# Patient Record
Sex: FEMALE | Race: BLACK OR AFRICAN AMERICAN | NOT HISPANIC OR LATINO | Employment: FULL TIME | URBAN - METROPOLITAN AREA
[De-identification: names, ages, dates, MRNs, and addresses within clinical notes are randomized per-mention and may not be internally consistent; named-entity substitution may affect disease eponyms.]

---

## 2017-06-25 ENCOUNTER — HOSPITAL ENCOUNTER (EMERGENCY)
Facility: HOSPITAL | Age: 42
Discharge: HOME | End: 2017-06-25
Attending: EMERGENCY MEDICINE
Payer: COMMERCIAL

## 2017-06-25 VITALS
RESPIRATION RATE: 18 BRPM | OXYGEN SATURATION: 98 % | SYSTOLIC BLOOD PRESSURE: 136 MMHG | HEIGHT: 66 IN | WEIGHT: 242 LBS | TEMPERATURE: 99 F | BODY MASS INDEX: 38.89 KG/M2 | HEART RATE: 52 BPM | DIASTOLIC BLOOD PRESSURE: 95 MMHG

## 2017-06-25 DIAGNOSIS — R07.9 CHEST PAIN, UNSPECIFIED TYPE: Primary | ICD-10-CM

## 2017-06-25 DIAGNOSIS — R07.9 CHEST PAIN: ICD-10-CM

## 2017-06-25 LAB
ALBUMIN SERPL BCP-MCNC: 3.4 G/DL
ALP SERPL-CCNC: 75 U/L
ALT SERPL W/O P-5'-P-CCNC: 22 U/L
ANION GAP SERPL CALC-SCNC: 9 MMOL/L
AST SERPL-CCNC: 32 U/L
BASOPHILS # BLD AUTO: 0.03 K/UL
BASOPHILS NFR BLD: 0.5 %
BILIRUB SERPL-MCNC: 0.5 MG/DL
BUN SERPL-MCNC: 11 MG/DL
CALCIUM SERPL-MCNC: 9 MG/DL
CHLORIDE SERPL-SCNC: 109 MMOL/L
CO2 SERPL-SCNC: 20 MMOL/L
CREAT SERPL-MCNC: 0.8 MG/DL
DIFFERENTIAL METHOD: NORMAL
EOSINOPHIL # BLD AUTO: 0.1 K/UL
EOSINOPHIL NFR BLD: 2.3 %
ERYTHROCYTE [DISTWIDTH] IN BLOOD BY AUTOMATED COUNT: 13.2 %
EST. GFR  (AFRICAN AMERICAN): >60 ML/MIN/1.73 M^2
EST. GFR  (NON AFRICAN AMERICAN): >60 ML/MIN/1.73 M^2
GLUCOSE SERPL-MCNC: 77 MG/DL
HCT VFR BLD AUTO: 41.3 %
HGB BLD-MCNC: 13.7 G/DL
LYMPHOCYTES # BLD AUTO: 1.8 K/UL
LYMPHOCYTES NFR BLD: 29.5 %
MCH RBC QN AUTO: 27.8 PG
MCHC RBC AUTO-ENTMCNC: 33.2 %
MCV RBC AUTO: 84 FL
MONOCYTES # BLD AUTO: 0.5 K/UL
MONOCYTES NFR BLD: 9 %
NEUTROPHILS # BLD AUTO: 3.5 K/UL
NEUTROPHILS NFR BLD: 58.5 %
PLATELET # BLD AUTO: 206 K/UL
PMV BLD AUTO: 11.7 FL
POTASSIUM SERPL-SCNC: 4.5 MMOL/L
PROT SERPL-MCNC: 7.9 G/DL
RBC # BLD AUTO: 4.92 M/UL
SODIUM SERPL-SCNC: 138 MMOL/L
TROPONIN I SERPL DL<=0.01 NG/ML-MCNC: 0.01 NG/ML
WBC # BLD AUTO: 5.99 K/UL

## 2017-06-25 PROCEDURE — 84484 ASSAY OF TROPONIN QUANT: CPT

## 2017-06-25 PROCEDURE — 80053 COMPREHEN METABOLIC PANEL: CPT

## 2017-06-25 PROCEDURE — 85025 COMPLETE CBC W/AUTO DIFF WBC: CPT

## 2017-06-25 PROCEDURE — 93005 ELECTROCARDIOGRAM TRACING: CPT

## 2017-06-25 PROCEDURE — 96372 THER/PROPH/DIAG INJ SC/IM: CPT

## 2017-06-25 PROCEDURE — 99284 EMERGENCY DEPT VISIT MOD MDM: CPT | Mod: 25

## 2017-06-25 PROCEDURE — 63600175 PHARM REV CODE 636 W HCPCS: Performed by: EMERGENCY MEDICINE

## 2017-06-25 RX ORDER — SUMATRIPTAN SUCCINATE 25 MG/1
50 TABLET ORAL
COMMUNITY
End: 2021-02-22 | Stop reason: CLARIF

## 2017-06-25 RX ORDER — ACETAMINOPHEN 325 MG/1
650 TABLET ORAL EVERY 6 HOURS PRN
Qty: 30 TABLET | Refills: 0 | Status: SHIPPED | OUTPATIENT
Start: 2017-06-25 | End: 2017-07-27

## 2017-06-25 RX ORDER — KETOROLAC TROMETHAMINE 30 MG/ML
15 INJECTION, SOLUTION INTRAMUSCULAR; INTRAVENOUS
Status: COMPLETED | OUTPATIENT
Start: 2017-06-25 | End: 2017-06-25

## 2017-06-25 RX ORDER — PREDNISONE 50 MG/1
50 TABLET ORAL DAILY
Qty: 5 TABLET | Refills: 0 | Status: SHIPPED | OUTPATIENT
Start: 2017-06-25 | End: 2017-06-30

## 2017-06-25 RX ADMIN — KETOROLAC TROMETHAMINE 15 MG: 30 INJECTION, SOLUTION INTRAMUSCULAR at 01:06

## 2017-06-25 NOTE — DISCHARGE INSTRUCTIONS
Please follow-up with your regular doctor this week.  Return for new or worsening symptoms such as worsening chest pain or shortness of breath or fever.  Please take medications as prescribed.

## 2017-06-25 NOTE — ED TRIAGE NOTES
42 y/o female comes in today with the cc of cp since Thursday, pt denies gypsy, N/V. Pt states she has hx of pleurisy. Pt in no distress, skin is normal.

## 2017-06-25 NOTE — ED PROVIDER NOTES
Encounter Date: 2017    SCRIBE #1 NOTE: I, Albaro Lilly, am scribing for, and in the presence of,  Tor Hampton Jr., MD. I have scribed the following portions of the note - Other sections scribed: HPI, ROS.       History     Chief Complaint   Patient presents with    Chest Pain     Chest pains, comes and go but constant today.  center chest radiating to back.  denies pain in arm or neck, denies nausea or vomiting.  Hx Pleura     CC: Chest Pain    HPI: This 41 year old female with a past medical history of migraine headache and pleurisy presents to the ED complaining of a 4 day history of a sharp chest pain radiating to her back. Pt reports the pain was intermittent the first 3 days and has been constant since last night. She reports the pain is exacerbated with movement, deep breaths, and talking. Pt denies associated weakness and numbness. Pt reports the pain feels similar to her pleurisy pain. Pt also denies cough, weakness, vomiting, fever, leg swelling, and diaphoresis. Pt denies a history of pulmonary embolism or blood clots. No other symptoms reported. No alleviating factors reported. No attempts at medication have been made.             The history is provided by the patient. No  was used.     Review of patient's allergies indicates:   Allergen Reactions    Rocephin [ceftriaxone] Hives and Itching     Past Medical History:   Diagnosis Date    Migraine headache      Past Surgical History:   Procedure Laterality Date    CERVICAL CERCLAGE       SECTION, CLASSIC      HYSTERECTOMY       Family History   Problem Relation Age of Onset    Heart disease Father     Diabetes Paternal Grandmother     Colon cancer Neg Hx     Breast cancer Neg Hx     Ovarian cancer Neg Hx      Social History   Substance Use Topics    Smoking status: Never Smoker    Smokeless tobacco: Never Used    Alcohol use No     Review of Systems   Constitutional: Negative for diaphoresis and  fever.   HENT: Negative for sore throat.    Respiratory: Negative for cough and shortness of breath.    Cardiovascular: Positive for chest pain (radiating to back). Negative for leg swelling.   Gastrointestinal: Negative for nausea and vomiting.   Genitourinary: Negative for dysuria.   Musculoskeletal: Positive for back pain.   Skin: Negative for rash.   Neurological: Negative for weakness and numbness.   Hematological: Does not bruise/bleed easily.       Physical Exam     Initial Vitals [06/25/17 1155]   BP Pulse Resp Temp SpO2   (!) 164/100 62 18 98.7 °F (37.1 °C) 99 %      MAP       121.33         Physical Exam    Nursing note and vitals reviewed.  Constitutional: She appears well-developed and well-nourished. She is not diaphoretic. No distress.   HENT:   Head: Normocephalic and atraumatic.   Right Ear: External ear normal.   Left Ear: External ear normal.   Nose: Nose normal.   Mouth/Throat: Oropharynx is clear and moist.   Eyes: Conjunctivae and EOM are normal. Pupils are equal, round, and reactive to light. Right eye exhibits no discharge. Left eye exhibits no discharge. No scleral icterus.   Neck: Normal range of motion. Neck supple.   Cardiovascular: Normal rate, regular rhythm, normal heart sounds and intact distal pulses.   No murmur heard.  Pulmonary/Chest: Breath sounds normal. No respiratory distress. She has no wheezes. She has no rhonchi. She has no rales.   Abdominal: Soft. Bowel sounds are normal. She exhibits no distension and no mass. There is no tenderness. There is no rebound and no guarding.   Musculoskeletal: Normal range of motion. She exhibits no edema or tenderness.   Neurological: She is alert and oriented to person, place, and time. She has normal strength. No cranial nerve deficit or sensory deficit.   Skin: Skin is warm and dry. No rash noted. No erythema. No pallor.   Psychiatric: She has a normal mood and affect. Her behavior is normal. Judgment and thought content normal.         ED  Course   Procedures  Labs Reviewed   COMPREHENSIVE METABOLIC PANEL - Abnormal; Notable for the following:        Result Value    CO2 20 (*)     Albumin 3.4 (*)     All other components within normal limits   CBC W/ AUTO DIFFERENTIAL   TROPONIN I     EKG Readings: (Independently Interpreted)   Initial Reading: No STEMI.   EKG reviewed and interpreted by me shows sinus bradycardia at a rate of 55.  MS, QRS, QT intervals within normal limits.  Is a normal axis.  There is normal R-wave progression.  There are no ST segment or T-wave ischemic findings.       X-Rays:   Independently Interpreted Readings:   Other Readings:  Chest x-ray reviewed and interpreted by me shows no evidence of pulmonary edema, pneumothorax, or consolidations/ infiltrates.    Medical Decision Making:   ED Management:  This is the emergent evaluation of a 41-year-old female presents the emergency department complaining of chest pain which has been intermittent for 2 days and then constant for the past 18 hours approximately.Differential diagnosis at the time of initial evaluation included, but was not limited to:  acute myocardial infarction, acute coronary syndrome, pericarditis, myocarditis, pneumonia, pneumothorax, gastroesophageal reflux disease, pleurisy, costochondritis.  I consider but doubt pulmonary embolus and aortic dissection.  The patient states that she has a history of pleurisy and this feels like her past episodes of pleurisy.  She is not coughing is no fever.  No recent immobilization.  No lower extremity erythema, edema, or asymmetry.  No neurologic symptoms.  Patient isn't hypertensive or tachycardic.  Patient states pain is worse with taking a deep breath.  This is likely pleurisy.  The patient's chest x-ray and laboratory evaluation EKG are unremarkable and on concerning.  Patient has had chest pain which is been constant for at least the last 18 hours.  However, her troponin was almost undetectable.  I doubt acute coronary  syndrome.  I will have this patient treated for inflammation symptomatically and follow-up with her PCP.  She was advised return for any new or worsening symptoms such as fever, worsening shortness of breath, worsening chest pain.            Scribe Attestation:   Scribe #1: I performed the above scribed service and the documentation accurately describes the services I performed. I attest to the accuracy of the note.    Attending Attestation:           Physician Attestation for Scribe:  Physician Attestation Statement for Scribe #1: I, Tor Hampton Jr., MD, reviewed documentation, as scribed by Albaro Lilly in my presence, and it is both accurate and complete.                 ED Course     Clinical Impression:   The primary encounter diagnosis was Chest pain, unspecified type. A diagnosis of Chest pain was also pertinent to this visit.                           Tor Hampton Jr., MD  06/25/17 8220

## 2017-07-27 ENCOUNTER — PATIENT MESSAGE (OUTPATIENT)
Dept: ADMINISTRATIVE | Facility: OTHER | Age: 42
End: 2017-07-27

## 2017-07-27 ENCOUNTER — OFFICE VISIT (OUTPATIENT)
Dept: FAMILY MEDICINE | Facility: CLINIC | Age: 42
End: 2017-07-27
Payer: COMMERCIAL

## 2017-07-27 VITALS
BODY MASS INDEX: 42.35 KG/M2 | WEIGHT: 269.81 LBS | OXYGEN SATURATION: 97 % | HEART RATE: 87 BPM | DIASTOLIC BLOOD PRESSURE: 72 MMHG | SYSTOLIC BLOOD PRESSURE: 128 MMHG | HEIGHT: 67 IN | TEMPERATURE: 98 F

## 2017-07-27 DIAGNOSIS — E66.9 OBESITY (BMI 30-39.9): ICD-10-CM

## 2017-07-27 DIAGNOSIS — R09.1 PLEURISY: ICD-10-CM

## 2017-07-27 DIAGNOSIS — J45.901 ASTHMA WITH ACUTE EXACERBATION, UNSPECIFIED ASTHMA SEVERITY: Primary | ICD-10-CM

## 2017-07-27 PROCEDURE — 99999 PR PBB SHADOW E&M-EST. PATIENT-LVL III: CPT | Mod: PBBFAC,,, | Performed by: NURSE PRACTITIONER

## 2017-07-27 PROCEDURE — 99214 OFFICE O/P EST MOD 30 MIN: CPT | Mod: S$GLB,,, | Performed by: NURSE PRACTITIONER

## 2017-07-27 RX ORDER — PREDNISONE 20 MG/1
20 TABLET ORAL 2 TIMES DAILY
Qty: 10 TABLET | Refills: 0 | Status: SHIPPED | OUTPATIENT
Start: 2017-07-27 | End: 2017-08-01

## 2017-07-27 RX ORDER — CODEINE PHOSPHATE AND GUAIFENESIN 10; 100 MG/5ML; MG/5ML
5 SOLUTION ORAL EVERY 4 HOURS PRN
Qty: 120 ML | Refills: 0 | Status: SHIPPED | OUTPATIENT
Start: 2017-07-27 | End: 2018-10-10 | Stop reason: CLARIF

## 2017-07-27 RX ORDER — AZITHROMYCIN 250 MG/1
TABLET, FILM COATED ORAL
Qty: 6 TABLET | Refills: 0 | Status: SHIPPED | OUTPATIENT
Start: 2017-07-27 | End: 2018-10-10 | Stop reason: CLARIF

## 2017-07-27 NOTE — PROGRESS NOTES
This dictation has been generated using Dragon Dictation some phonetic errors may occur.     Susanne was seen today for cough.    Diagnoses and all orders for this visit:    Asthma with acute exacerbation, unspecified asthma severity    Pleurisy    Obesity (BMI 30-39.9)  -     Ambulatory Referral to [facility] ([facility])    Other orders  -     guaifenesin-codeine 100-10 mg/5 ml (CHERATUSSIN AC)  mg/5 mL syrup; Take 5 mLs by mouth every 4 (four) hours as needed for Cough. Warning:  Risk of somnolence.  -     azithromycin (Z-KRISTEN) 250 MG tablet; Take 2 tablets by mouth on day 1; Take 1 tablet by mouth on days 2-5  -     predniSONE (DELTASONE) 20 MG tablet; Take 1 tablet (20 mg total) by mouth 2 (two) times daily.  -     [facility] ASSIGN QUESTIONNAIRE SERIES ([facility])  -     [facility] [place] Patient Entered [facility] ([facility])    Rx as above.  No AMI/PE.   Pt request referral to [facility]. She has to complete questionnaires.  Discussed grief and loss. Refer to  and consider grief group.     Return if symptoms worsen or fail to improve.      ________________________________________________________________  ________________________________________________________________        Chief Complaint   Patient presents with    Cough     History of present illness  This 42 y.o. presents today for complaint of chest pain and cold symptoms.  Patient notes symptoms have been present for about 5 weeks.  She went to the emergency room about a month ago for cough and cold issues.  She was having wheezing and shortness of breath.  She has asthma.  Diagnosed with pleurisy.  Her symptoms seemed to improve briefly.  She then came down with strep throat and received a prescription for amoxicillin which she has completed.  The cough has been persistent now for 5 weeks.  She indicates that cough is occasionally productive.  She does cough to the point of vomiting.  She had promethazine VC, Mucinex D, and other cough med  without improvement.   Patient also notes  Increased stress and trouble coping with the loss of mother on .  She hasn't sought any psychiatric care.  She does become emotional.  No suicidal or violent ideation.  Mother had been coping with cancer and had completed chemotherapy and some radiation when she developed a bowel obstruction.  She wouldn't or couldn't shut down and passed away on the night.      Answers for HPI/ROS submitted by the patient on 2017   Cough  Chronicity: chronic  Onset: more than 1 month ago  Progression since onset: waxing and waning  Frequency: every few minutes  Cough characteristics: non-productive  chest pain: Yes  headaches: Yes  myalgias: Yes  shortness of breath: Yes  sweats: Yes  Aggravated by: stress  asthma: Yes  environmental allergies: Yes  Treatments tried: OTC cough suppressant, body position changes, leukotriene antagonists, prescription cough suppressant, rest  Improvement on treatment: no relief    Reviewed histories. Engaged fiancee of 4 years.   Past Medical History:   Diagnosis Date    Migraine headache        Past Surgical History:   Procedure Laterality Date    CERVICAL CERCLAGE       SECTION, CLASSIC      HYSTERECTOMY         Family History   Problem Relation Age of Onset    Heart disease Father     Diabetes Paternal Grandmother     Colon cancer Neg Hx     Breast cancer Neg Hx     Ovarian cancer Neg Hx        Social History     Social History    Marital status:      Spouse name: N/A    Number of children: 1    Years of education: N/A     Occupational History     [employer]     Social History Main Topics    Smoking status: Never Smoker    Smokeless tobacco: Never Used    Alcohol use No    Drug use: No    Sexual activity: Yes     Partners: Male     Birth control/ protection: Condom     Other Topics Concern    None     Social History Narrative    .  .  Healthy son.  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  Non smoker.          Current Outpatient Prescriptions   Medication Sig Dispense Refill    BIOTIN ORAL Take 200 mg by mouth once daily.      multivitamin (ONE DAILY MULTIVITAMIN) per tablet Take 1 tablet by mouth once daily.      sumatriptan (IMITREX) 25 MG Tab Take 50 mg by mouth every 2 (two) hours as needed.      azithromycin (Z-KRISTEN) 250 MG tablet Take 2 tablets by mouth on day 1; Take 1 tablet by mouth on days 2-5 6 tablet 0    guaifenesin-codeine 100-10 mg/5 ml (CHERATUSSIN AC)  mg/5 mL syrup Take 5 mLs by mouth every 4 (four) hours as needed for Cough. Warning:  Risk of somnolence. 120 mL 0    predniSONE (DELTASONE) 20 MG tablet Take 1 tablet (20 mg total) by mouth 2 (two) times daily. 10 tablet 0     No current facility-administered medications for this visit.        Review of patient's allergies indicates:   Allergen Reactions    Rocephin [ceftriaxone] Hives and Itching       Physical examination  Vitals Reviewed  Gen. Well-dressed well-nourished no apparent distress  Skin warm dry and intact.  No rashes noted.  HEENT.  TM intact bilateral with normal light reflex.  No mastoid tenderness during percussion.  Nares patent bilateral.  Pharynx is unremarkable.  No maxillary or frontal sinus tenderness when percussed.    Neck is supple without adenopathy  Chest.  Respirations are even unlabored.  Lungs are clear to auscultation.  Cardiac regular rate and rhythm.  No chest wall adenopathy noted.  Neuro. Awake alert oriented x4.  Normal judgment and cognition noted.  Extremities no clubbing cyanosis or edema noted.     Call or return to clinic prn if these symptoms worsen or fail to improve as anticipated.

## 2017-08-21 ENCOUNTER — HOSPITAL ENCOUNTER (OUTPATIENT)
Dept: RADIOLOGY | Facility: HOSPITAL | Age: 42
Discharge: HOME | End: 2017-08-21
Attending: INTERNAL MEDICINE
Payer: COMMERCIAL

## 2017-08-21 ENCOUNTER — PATIENT MESSAGE (OUTPATIENT)
Dept: FAMILY MEDICINE | Facility: CLINIC | Age: 42
End: 2017-08-21

## 2017-08-21 VITALS — BODY MASS INDEX: 42.22 KG/M2 | WEIGHT: 269 LBS | HEIGHT: 67 IN

## 2017-08-21 DIAGNOSIS — Z12.31 ENCOUNTER FOR SCREENING MAMMOGRAM FOR BREAST CANCER: ICD-10-CM

## 2017-08-21 DIAGNOSIS — Z12.31 ENCOUNTER FOR SCREENING MAMMOGRAM FOR BREAST CANCER: Primary | ICD-10-CM

## 2017-08-21 PROCEDURE — 77067 SCR MAMMO BI INCL CAD: CPT | Mod: 26,,, | Performed by: RADIOLOGY

## 2017-08-21 PROCEDURE — 77063 BREAST TOMOSYNTHESIS BI: CPT | Mod: 26,,, | Performed by: RADIOLOGY

## 2017-08-21 PROCEDURE — 77067 SCR MAMMO BI INCL CAD: CPT | Mod: TC

## 2017-09-05 ENCOUNTER — PATIENT MESSAGE (OUTPATIENT)
Dept: FAMILY MEDICINE | Facility: CLINIC | Age: 42
End: 2017-09-05

## 2018-10-10 ENCOUNTER — HOSPITAL ENCOUNTER (OUTPATIENT)
Dept: RADIOLOGY | Facility: HOSPITAL | Age: 43
Discharge: HOME | End: 2018-10-10
Attending: PODIATRIST
Payer: COMMERCIAL

## 2018-10-10 ENCOUNTER — OFFICE VISIT (OUTPATIENT)
Dept: PODIATRY | Facility: CLINIC | Age: 43
End: 2018-10-10
Payer: COMMERCIAL

## 2018-10-10 VITALS
DIASTOLIC BLOOD PRESSURE: 89 MMHG | BODY MASS INDEX: 41.43 KG/M2 | SYSTOLIC BLOOD PRESSURE: 127 MMHG | WEIGHT: 273.38 LBS | HEART RATE: 76 BPM | HEIGHT: 68 IN

## 2018-10-10 DIAGNOSIS — M79.672 FOOT PAIN, BILATERAL: ICD-10-CM

## 2018-10-10 DIAGNOSIS — M72.2 PLANTAR FASCIITIS: Primary | ICD-10-CM

## 2018-10-10 DIAGNOSIS — M79.671 FOOT PAIN, BILATERAL: ICD-10-CM

## 2018-10-10 DIAGNOSIS — M24.573 EQUINUS CONTRACTURE OF ANKLE: ICD-10-CM

## 2018-10-10 DIAGNOSIS — M72.2 PLANTAR FASCIITIS: ICD-10-CM

## 2018-10-10 PROCEDURE — 99203 OFFICE O/P NEW LOW 30 MIN: CPT | Mod: 25,S$GLB,, | Performed by: PODIATRIST

## 2018-10-10 PROCEDURE — 73630 X-RAY EXAM OF FOOT: CPT | Mod: 26,50,, | Performed by: RADIOLOGY

## 2018-10-10 PROCEDURE — 29540 STRAPPING ANKLE &/FOOT: CPT | Mod: 50,S$GLB,, | Performed by: PODIATRIST

## 2018-10-10 PROCEDURE — 99999 PR PBB SHADOW E&M-EST. PATIENT-LVL III: CPT | Mod: PBBFAC,,, | Performed by: PODIATRIST

## 2018-10-10 PROCEDURE — 3008F BODY MASS INDEX DOCD: CPT | Mod: CPTII,S$GLB,, | Performed by: PODIATRIST

## 2018-10-10 PROCEDURE — 73630 X-RAY EXAM OF FOOT: CPT | Mod: 50,TC

## 2018-10-10 RX ORDER — MELOXICAM 15 MG/1
15 TABLET ORAL DAILY
Qty: 30 TABLET | Refills: 0 | Status: SHIPPED | OUTPATIENT
Start: 2018-10-10 | End: 2020-03-11

## 2018-10-10 NOTE — PROGRESS NOTES
Subjective:      Patient ID: Susanne Marin is a 43 y.o. female.    Chief Complaint: Foot Pain (both feet)    Sharp deep pain bottom both feet.  Gradual onset, worsening over past several weeks, aggravated by increased weight bearing, shoe gear, pressure.  No previous medical treatment.  OTC pain med not helping. Denies trauma, surgery both feet.    Review of Systems   Constitution: Negative for chills, diaphoresis, fever, malaise/fatigue and night sweats.   Cardiovascular: Negative for claudication, cyanosis, leg swelling and syncope.   Skin: Negative for color change, dry skin, nail changes, rash, suspicious lesions and unusual hair distribution.   Musculoskeletal: Negative for falls, joint pain, joint swelling, muscle cramps, muscle weakness and stiffness.   Gastrointestinal: Negative for constipation, diarrhea, nausea and vomiting.   Neurological: Negative for brief paralysis, disturbances in coordination, focal weakness, numbness, paresthesias, sensory change and tremors.           Objective:      Physical Exam   Constitutional: She is oriented to person, place, and time. She appears well-developed and well-nourished. She is cooperative. No distress.   Cardiovascular:   Pulses:       Popliteal pulses are 2+ on the right side, and 2+ on the left side.        Dorsalis pedis pulses are 2+ on the right side, and 2+ on the left side.        Posterior tibial pulses are 2+ on the right side, and 2+ on the left side.   Capillary refill 3 seconds all toes/distal feet, all toes/both feet warm to touch.      Negative lymphadenopathy bilateral popliteal fossa and tarsal tunnel.      Negavie lower extremity edema bilateral.     Musculoskeletal:        Right ankle: She exhibits normal range of motion, no swelling, no ecchymosis, no deformity, no laceration and normal pulse. Achilles tendon normal. Achilles tendon exhibits no pain, no defect and normal Leiva's test results.   Sharp deep pain to palpation inferior heel  right and left at medial calcaneal tubercle without ecchymosis, erythema, edema, or cardinal signs infection, and no signs of trauma.    Ankle dorsiflexion decreased at <10 degrees bilateral with moderate increase with knee flexion bilateral.    Otherwise, Normal angle, base, station of gait. All ten toes without clubbing, cyanosis, or signs of ischemia.  No pain to palpation bilateral lower extremities.  Range of motion, stability, muscle strength, and muscle tone normal bilateral feet and legs.     Lymphadenopathy: No inguinal adenopathy noted on the right or left side.   Negative lymphadenopathy bilateral popliteal fossa and tarsal tunnel.    Negative lymphangitic streaking bilateral feet/ankles/legs.   Neurological: She is alert and oriented to person, place, and time. She has normal strength. She displays no atrophy and no tremor. No sensory deficit. She exhibits normal muscle tone. Gait normal.   Reflex Scores:       Patellar reflexes are 2+ on the right side and 2+ on the left side.       Achilles reflexes are 2+ on the right side and 2+ on the left side.  Negative tinel sign to percussion sural, superficial peroneal, deep peroneal, saphenous, and posterior tibial nerves right and left ankles and feet.     Skin: Skin is warm, dry and intact. Capillary refill takes 2 to 3 seconds. No abrasion, no bruising, no burn, no ecchymosis, no laceration, no lesion and no rash noted. She is not diaphoretic. No cyanosis or erythema. No pallor. Nails show no clubbing.   Skin is normal age and health appropriate color, turgor, texture, and temperature bilateral lower extremities without ulceration, hyperpigmentation, discoloration, masses nodules or cords palpated.  No ecchymosis, erythema, edema, or cardinal signs of infection bilateral lower extremities.     Psychiatric: She has a normal mood and affect.             Assessment:       Encounter Diagnoses   Name Primary?    Plantar fasciitis Yes    Foot pain, bilateral      Equinus contracture of ankle          Plan:       Susanne was seen today for foot pain.    Diagnoses and all orders for this visit:    Plantar fasciitis  -     X-Ray Foot Complete Bilateral; Future    Foot pain, bilateral  -     X-Ray Foot Complete Bilateral; Future    Equinus contracture of ankle  -     X-Ray Foot Complete Bilateral; Future    Other orders  -     meloxicam (MOBIC) 15 MG tablet; Take 1 tablet (15 mg total) by mouth once daily.      I counseled the patient on her conditions, their implications and medical management.        Patient will stretch the tendo achilles complex three times daily as demonstrated in the office.  Literature was dispensed illustrating proper stretching technique.    I applied a plantar rest strapping to the patient's right and left foot to offload symptomatic area, support the arch, and relieve pain.    Patient will obtain over the counter arch supports and wear them in shoes whenever possible.  Athletic shoes intended for walking or running are usually best.    The patient was advised that NSAID-type medications have two very important potential side effects: gastrointestinal irritation including hemorrhage and renal injuries. She was asked to take the medication with food and to stop if she experiences any GI upset. I asked her to call for vomiting, abdominal pain or black/bloody stools. The patient expresses understanding of these issues and questions were answered.    Discussed conservative treatment with shoes of adequate dimensions, material, and style to alleviate symptoms and delay or prevent surgical intervention.    Rx xrays, meloxicam          No Follow-up on file.

## 2018-11-07 ENCOUNTER — OFFICE VISIT (OUTPATIENT)
Dept: PODIATRY | Facility: CLINIC | Age: 43
End: 2018-11-07
Payer: COMMERCIAL

## 2018-11-07 VITALS
SYSTOLIC BLOOD PRESSURE: 125 MMHG | HEIGHT: 70 IN | DIASTOLIC BLOOD PRESSURE: 82 MMHG | WEIGHT: 273 LBS | BODY MASS INDEX: 39.08 KG/M2 | HEART RATE: 56 BPM

## 2018-11-07 DIAGNOSIS — M79.672 FOOT PAIN, BILATERAL: ICD-10-CM

## 2018-11-07 DIAGNOSIS — M24.573 EQUINUS CONTRACTURE OF ANKLE: ICD-10-CM

## 2018-11-07 DIAGNOSIS — M79.671 FOOT PAIN, BILATERAL: ICD-10-CM

## 2018-11-07 DIAGNOSIS — M72.2 PLANTAR FASCIITIS: Primary | ICD-10-CM

## 2018-11-07 PROCEDURE — 3008F BODY MASS INDEX DOCD: CPT | Mod: CPTII,S$GLB,, | Performed by: PODIATRIST

## 2018-11-07 PROCEDURE — 99213 OFFICE O/P EST LOW 20 MIN: CPT | Mod: 25,S$GLB,, | Performed by: PODIATRIST

## 2018-11-07 PROCEDURE — 99999 PR PBB SHADOW E&M-EST. PATIENT-LVL III: CPT | Mod: PBBFAC,,, | Performed by: PODIATRIST

## 2018-11-07 RX ORDER — METHYLPREDNISOLONE 4 MG/1
TABLET ORAL
Qty: 1 PACKAGE | Refills: 0 | Status: SHIPPED | OUTPATIENT
Start: 2018-11-07 | End: 2018-12-08 | Stop reason: ALTCHOICE

## 2018-11-07 NOTE — PROGRESS NOTES
Subjective:      Patient ID: Susanne Marin is a 43 y.o. female.    Chief Complaint: Plantar Fasciitis and Foot Pain    Sharp deep pain bottom both feet.  Gradual onset, worsening over past several weeks, aggravated by increased weight bearing, shoe gear, pressure.  Modest improvement with inserts, athletic shoes, stretches, meloxicam (hurt stomach - stopped).  Denies trauma, surgery both feet.  xrays negative acute injury.    Review of Systems   Constitution: Negative for chills, diaphoresis, fever, malaise/fatigue and night sweats.   Cardiovascular: Negative for claudication, cyanosis, leg swelling and syncope.   Skin: Negative for color change, dry skin, nail changes, rash, suspicious lesions and unusual hair distribution.   Musculoskeletal: Negative for falls, joint pain, joint swelling, muscle cramps, muscle weakness and stiffness.   Gastrointestinal: Negative for constipation, diarrhea, nausea and vomiting.   Neurological: Negative for brief paralysis, disturbances in coordination, focal weakness, numbness, paresthesias, sensory change and tremors.           Objective:      Physical Exam   Constitutional: She is oriented to person, place, and time. She appears well-developed and well-nourished. She is cooperative. No distress.   Cardiovascular:   Pulses:       Popliteal pulses are 2+ on the right side, and 2+ on the left side.        Dorsalis pedis pulses are 2+ on the right side, and 2+ on the left side.        Posterior tibial pulses are 2+ on the right side, and 2+ on the left side.   Capillary refill 3 seconds all toes/distal feet, all toes/both feet warm to touch.      Negative lymphadenopathy bilateral popliteal fossa and tarsal tunnel.      Negavie lower extremity edema bilateral.     Musculoskeletal:        Right ankle: She exhibits normal range of motion, no swelling, no ecchymosis, no deformity, no laceration and normal pulse. Achilles tendon normal. Achilles tendon exhibits no pain, no defect and  normal Leiva's test results.   Sharp deep pain to palpation inferior heel right and left at medial calcaneal tubercle without ecchymosis, erythema, edema, or cardinal signs infection, and no signs of trauma.    Ankle dorsiflexion decreased at <10 degrees bilateral with moderate increase with knee flexion bilateral.    Otherwise, Normal angle, base, station of gait. All ten toes without clubbing, cyanosis, or signs of ischemia.  No pain to palpation bilateral lower extremities.  Range of motion, stability, muscle strength, and muscle tone normal bilateral feet and legs.     Lymphadenopathy: No inguinal adenopathy noted on the right or left side.   Negative lymphadenopathy bilateral popliteal fossa and tarsal tunnel.    Negative lymphangitic streaking bilateral feet/ankles/legs.   Neurological: She is alert and oriented to person, place, and time. She has normal strength. She displays no atrophy and no tremor. No sensory deficit. She exhibits normal muscle tone. Gait normal.   Reflex Scores:       Patellar reflexes are 2+ on the right side and 2+ on the left side.       Achilles reflexes are 2+ on the right side and 2+ on the left side.  Negative tinel sign to percussion sural, superficial peroneal, deep peroneal, saphenous, and posterior tibial nerves right and left ankles and feet.     Skin: Skin is warm, dry and intact. Capillary refill takes 2 to 3 seconds. No abrasion, no bruising, no burn, no ecchymosis, no laceration, no lesion and no rash noted. She is not diaphoretic. No cyanosis or erythema. No pallor. Nails show no clubbing.   Skin is normal age and health appropriate color, turgor, texture, and temperature bilateral lower extremities without ulceration, hyperpigmentation, discoloration, masses nodules or cords palpated.  No ecchymosis, erythema, edema, or cardinal signs of infection bilateral lower extremities.     Psychiatric: She has a normal mood and affect.             Assessment:       Encounter  Diagnoses   Name Primary?    Plantar fasciitis Yes    Foot pain, bilateral     Equinus contracture of ankle          Plan:       Susanne was seen today for plantar fasciitis and foot pain.    Diagnoses and all orders for this visit:    Plantar fasciitis  -     Ambulatory consult to Physical Therapy  -     ORTHOTIC DEVICE (DME)    Foot pain, bilateral  -     Ambulatory consult to Physical Therapy  -     ORTHOTIC DEVICE (DME)    Equinus contracture of ankle  -     Ambulatory consult to Physical Therapy  -     ORTHOTIC DEVICE (DME)    Other orders  -     methylPREDNISolone (MEDROL DOSEPACK) 4 mg tablet; use as directed      I counseled the patient on her conditions, their implications and medical management.        Continue stretches, inserts, athletic shoes.    Stop meloxicam.  Rx medrol dose pack.    Rx PT, custom orthotics.    Dispense night splint.    Declines injection, fx boot.          Follow-up in about 6 weeks (around 12/19/2018).

## 2018-12-03 ENCOUNTER — OFFICE VISIT (OUTPATIENT)
Dept: INTERNAL MEDICINE | Facility: CLINIC | Age: 43
End: 2018-12-03
Payer: COMMERCIAL

## 2018-12-03 ENCOUNTER — LAB VISIT (OUTPATIENT)
Dept: LAB | Facility: HOSPITAL | Age: 43
End: 2018-12-03
Attending: FAMILY MEDICINE
Payer: COMMERCIAL

## 2018-12-03 VITALS
HEART RATE: 79 BPM | BODY MASS INDEX: 42.04 KG/M2 | OXYGEN SATURATION: 98 % | HEIGHT: 67 IN | SYSTOLIC BLOOD PRESSURE: 132 MMHG | WEIGHT: 267.88 LBS | DIASTOLIC BLOOD PRESSURE: 88 MMHG

## 2018-12-03 DIAGNOSIS — K62.5 RECTAL BLEEDING: ICD-10-CM

## 2018-12-03 DIAGNOSIS — K62.5 RECTAL BLEEDING: Primary | ICD-10-CM

## 2018-12-03 DIAGNOSIS — R05.9 COUGH: ICD-10-CM

## 2018-12-03 LAB
ALBUMIN SERPL BCP-MCNC: 3.3 G/DL
ALP SERPL-CCNC: 83 U/L
ALT SERPL W/O P-5'-P-CCNC: 16 U/L
ANION GAP SERPL CALC-SCNC: 7 MMOL/L
AST SERPL-CCNC: 14 U/L
BASOPHILS # BLD AUTO: 0.04 K/UL
BASOPHILS NFR BLD: 0.7 %
BILIRUB SERPL-MCNC: 0.3 MG/DL
BUN SERPL-MCNC: 11 MG/DL
CALCIUM SERPL-MCNC: 9.2 MG/DL
CHLORIDE SERPL-SCNC: 107 MMOL/L
CO2 SERPL-SCNC: 28 MMOL/L
CREAT SERPL-MCNC: 0.8 MG/DL
DIFFERENTIAL METHOD: ABNORMAL
EOSINOPHIL # BLD AUTO: 0.2 K/UL
EOSINOPHIL NFR BLD: 3.6 %
ERYTHROCYTE [DISTWIDTH] IN BLOOD BY AUTOMATED COUNT: 13 %
EST. GFR  (AFRICAN AMERICAN): >60 ML/MIN/1.73 M^2
EST. GFR  (NON AFRICAN AMERICAN): >60 ML/MIN/1.73 M^2
FERRITIN SERPL-MCNC: 215 NG/ML
GLUCOSE SERPL-MCNC: 81 MG/DL
HCT VFR BLD AUTO: 40.3 %
HGB BLD-MCNC: 12.5 G/DL
IRON SERPL-MCNC: 86 UG/DL
LYMPHOCYTES # BLD AUTO: 1.9 K/UL
LYMPHOCYTES NFR BLD: 30.9 %
MCH RBC QN AUTO: 26.8 PG
MCHC RBC AUTO-ENTMCNC: 31 G/DL
MCV RBC AUTO: 86 FL
MONOCYTES # BLD AUTO: 0.5 K/UL
MONOCYTES NFR BLD: 8.6 %
NEUTROPHILS # BLD AUTO: 3.4 K/UL
NEUTROPHILS NFR BLD: 55.7 %
PLATELET # BLD AUTO: 239 K/UL
PMV BLD AUTO: 10.7 FL
POTASSIUM SERPL-SCNC: 3.9 MMOL/L
PROT SERPL-MCNC: 7.4 G/DL
RBC # BLD AUTO: 4.67 M/UL
SATURATED IRON: 29 %
SODIUM SERPL-SCNC: 142 MMOL/L
TOTAL IRON BINDING CAPACITY: 299 UG/DL
TRANSFERRIN SERPL-MCNC: 202 MG/DL
WBC # BLD AUTO: 6.15 K/UL

## 2018-12-03 PROCEDURE — 36415 COLL VENOUS BLD VENIPUNCTURE: CPT

## 2018-12-03 PROCEDURE — 83540 ASSAY OF IRON: CPT

## 2018-12-03 PROCEDURE — 3008F BODY MASS INDEX DOCD: CPT | Mod: CPTII,S$GLB,, | Performed by: FAMILY MEDICINE

## 2018-12-03 PROCEDURE — 80053 COMPREHEN METABOLIC PANEL: CPT

## 2018-12-03 PROCEDURE — 82728 ASSAY OF FERRITIN: CPT

## 2018-12-03 PROCEDURE — 99999 PR PBB SHADOW E&M-EST. PATIENT-LVL IV: CPT | Mod: PBBFAC,,, | Performed by: FAMILY MEDICINE

## 2018-12-03 PROCEDURE — 85025 COMPLETE CBC W/AUTO DIFF WBC: CPT

## 2018-12-03 PROCEDURE — 99203 OFFICE O/P NEW LOW 30 MIN: CPT | Mod: S$GLB,,, | Performed by: FAMILY MEDICINE

## 2018-12-03 RX ORDER — BENZONATATE 100 MG/1
100 CAPSULE ORAL EVERY 4 HOURS PRN
Qty: 30 CAPSULE | Refills: 1 | Status: SHIPPED | OUTPATIENT
Start: 2018-12-03 | End: 2018-12-13

## 2018-12-03 NOTE — PROGRESS NOTES
"S/  UC visit, PCP Primary Doctor No - she says her PCP is Haroon Jean NP at [facility]  She reports painless bloody stools since Friday. She passes stool and there is a gush of blood after it and clot that fills the toilet bowl.  She was worried about it and went to the [facility] ER over the weekend. She reports the ER doc did a blood test, said it was normal (presumabley CBC) and that there was nothing for them to do in the ER, she needed to see her PCP to get a referral to see GI. Hence this UC same day visit  Denies feeling bad or weak. She continues to work and does not feel she needs a note to have time off.   She has had a cold this past week with a cough and said she would like to get a cough remedy  She wanted to know about ulcerative colitis, as a friend who has it told her she has the Sx. We discussed that while I can print out info on it from [employer] for her, it can only be diagnosed after the GI doc does a scope    O/  /88 (BP Location: Right arm, Patient Position: Sitting, BP Method: Large (Manual))   Pulse 79   Ht 5' 7" (1.702 m)   Wt 121.5 kg (267 lb 13.7 oz)   LMP 02/14/2014   SpO2 98%   BMI 41.95 kg/m²   We discussed whether there is any benefit to doing a rectal exam today.  As Gi will be doing it with either a flex sig or a colonoscopy, and it would not make a difference to decision making, no rectal done    A/P  Susanne was seen today for follow-up and rectal bleeding.    Diagnoses and all orders for this visit:    Rectal bleeding  -     Comprehensive metabolic panel; Future  -     CBC auto differential; Future  -     Iron and TIBC; Future  -     Ferritin; Future  -     Ambulatory consult to Gastroenterology    Cough  -     benzonatate (TESSALON) 100 MG capsule; Take 1 capsule (100 mg total) by mouth every 4 (four) hours as needed for Cough.              "

## 2018-12-03 NOTE — PATIENT INSTRUCTIONS
Wt Readings from Last 5 Encounters:   12/03/18 121.5 kg (267 lb 13.7 oz)   11/07/18 123.8 kg (273 lb)   10/10/18 124 kg (273 lb 5.9 oz)   08/21/17 122 kg (269 lb)   07/27/17 122.4 kg (269 lb 13.5 oz)

## 2018-12-08 ENCOUNTER — OFFICE VISIT (OUTPATIENT)
Dept: URGENT CARE | Facility: CLINIC | Age: 43
End: 2018-12-08
Payer: COMMERCIAL

## 2018-12-08 VITALS
RESPIRATION RATE: 18 BRPM | WEIGHT: 265 LBS | HEART RATE: 83 BPM | OXYGEN SATURATION: 98 % | SYSTOLIC BLOOD PRESSURE: 130 MMHG | HEIGHT: 67 IN | DIASTOLIC BLOOD PRESSURE: 84 MMHG | TEMPERATURE: 97 F | BODY MASS INDEX: 41.59 KG/M2

## 2018-12-08 DIAGNOSIS — J40 BRONCHITIS: Primary | ICD-10-CM

## 2018-12-08 PROCEDURE — 3008F BODY MASS INDEX DOCD: CPT | Mod: CPTII,S$GLB,, | Performed by: PHYSICIAN ASSISTANT

## 2018-12-08 PROCEDURE — 99214 OFFICE O/P EST MOD 30 MIN: CPT | Mod: 25,S$GLB,, | Performed by: PHYSICIAN ASSISTANT

## 2018-12-08 PROCEDURE — 96372 THER/PROPH/DIAG INJ SC/IM: CPT | Mod: S$GLB,,, | Performed by: PHYSICIAN ASSISTANT

## 2018-12-08 RX ORDER — PSEUDOEPHEDRINE HCL 120 MG/1
120 TABLET, FILM COATED, EXTENDED RELEASE ORAL 2 TIMES DAILY PRN
Qty: 20 TABLET | Refills: 0 | Status: SHIPPED | OUTPATIENT
Start: 2018-12-08 | End: 2018-12-18

## 2018-12-08 RX ORDER — OXYMETAZOLINE HCL 0.05 %
2 SPRAY, NON-AEROSOL (ML) NASAL 2 TIMES DAILY
Qty: 15 ML | Refills: 0 | Status: SHIPPED | OUTPATIENT
Start: 2018-12-08 | End: 2018-12-12

## 2018-12-08 RX ORDER — ALBUTEROL SULFATE 90 UG/1
2 AEROSOL, METERED RESPIRATORY (INHALATION) EVERY 4 HOURS PRN
Qty: 1 INHALER | Refills: 1 | Status: SHIPPED | OUTPATIENT
Start: 2018-12-08 | End: 2020-03-11

## 2018-12-08 RX ORDER — PROMETHAZINE HYDROCHLORIDE AND CODEINE PHOSPHATE 6.25; 1 MG/5ML; MG/5ML
5 SOLUTION ORAL EVERY 4 HOURS PRN
Qty: 118 ML | Refills: 0 | Status: SHIPPED | OUTPATIENT
Start: 2018-12-08 | End: 2020-03-11

## 2018-12-08 RX ORDER — BETAMETHASONE SODIUM PHOSPHATE AND BETAMETHASONE ACETATE 3; 3 MG/ML; MG/ML
9 INJECTION, SUSPENSION INTRA-ARTICULAR; INTRALESIONAL; INTRAMUSCULAR; SOFT TISSUE
Status: COMPLETED | OUTPATIENT
Start: 2018-12-08 | End: 2018-12-08

## 2018-12-08 RX ADMIN — BETAMETHASONE SODIUM PHOSPHATE AND BETAMETHASONE ACETATE 9 MG: 3; 3 INJECTION, SUSPENSION INTRA-ARTICULAR; INTRALESIONAL; INTRAMUSCULAR; SOFT TISSUE at 12:12

## 2018-12-08 NOTE — PROGRESS NOTES
"Subjective:       Patient ID: Susanne Marin is a 43 y.o. female.    Vitals:  height is 5' 7" (1.702 m) and weight is 120.2 kg (265 lb). Her temperature is 97.1 °F (36.2 °C). Her blood pressure is 130/84 and her pulse is 83. Her respiration is 18 and oxygen saturation is 98%.     Chief Complaint: Cough    Pt presents with cough, nasal congestion x 2 weeks. She states she was given z-pack without improvement.       Cough   This is a new problem. The current episode started 1 to 4 weeks ago. The problem has been unchanged. The cough is productive of sputum. Associated symptoms include wheezing. Pertinent negatives include no chills, ear pain, eye redness, fever, hemoptysis, myalgias, rash, sore throat or shortness of breath. She has tried OTC cough suppressant for the symptoms. The treatment provided no relief.       Constitution: Negative for chills, sweating, fatigue and fever.   HENT: Positive for congestion. Negative for ear pain, sinus pain, sinus pressure, sore throat and voice change.    Neck: Negative for painful lymph nodes.   Eyes: Negative for eye redness.   Respiratory: Positive for cough, sputum production and wheezing. Negative for chest tightness, bloody sputum, COPD, shortness of breath, stridor and asthma.    Gastrointestinal: Negative for nausea and vomiting.   Musculoskeletal: Negative for muscle ache.   Skin: Negative for rash.   Allergic/Immunologic: Negative for seasonal allergies and asthma.   Hematologic/Lymphatic: Negative for swollen lymph nodes.       Objective:      Physical Exam   Constitutional: She is oriented to person, place, and time. She appears well-developed and well-nourished. She is cooperative.  Non-toxic appearance. She does not appear ill. No distress.   HENT:   Head: Normocephalic and atraumatic.   Right Ear: Hearing, tympanic membrane, external ear and ear canal normal.   Left Ear: Hearing, tympanic membrane, external ear and ear canal normal.   Nose: Nose normal. No " mucosal edema, rhinorrhea or nasal deformity. No epistaxis. Right sinus exhibits no maxillary sinus tenderness and no frontal sinus tenderness. Left sinus exhibits no maxillary sinus tenderness and no frontal sinus tenderness.   Mouth/Throat: Uvula is midline, oropharynx is clear and moist and mucous membranes are normal. No trismus in the jaw. Normal dentition. No uvula swelling. No posterior oropharyngeal erythema.   Eyes: Conjunctivae and lids are normal. Right eye exhibits no discharge. Left eye exhibits no discharge. No scleral icterus.   Sclera clear bilat   Neck: Trachea normal, normal range of motion, full passive range of motion without pain and phonation normal. Neck supple.   Cardiovascular: Normal rate, regular rhythm, normal heart sounds, intact distal pulses and normal pulses.   Pulmonary/Chest: Effort normal and breath sounds normal. No respiratory distress.   Abdominal: Soft. Normal appearance and bowel sounds are normal. She exhibits no distension, no pulsatile midline mass and no mass. There is no tenderness.   Musculoskeletal: Normal range of motion. She exhibits no edema or deformity.   Neurological: She is alert and oriented to person, place, and time. She exhibits normal muscle tone. Coordination normal.   Skin: Skin is warm, dry and intact. She is not diaphoretic. No pallor.   Psychiatric: She has a normal mood and affect. Her speech is normal and behavior is normal. Judgment and thought content normal. Cognition and memory are normal.   Nursing note and vitals reviewed.      Assessment:       1. Bronchitis        Plan:         Bronchitis  -     betamethasone acetate-betamethasone sodium phosphate injection 9 mg  -     oxymetazoline (AFRIN) 0.05 % nasal spray; 2 sprays by Nasal route 2 (two) times daily. for 3 days  Dispense: 15 mL; Refill: 0  -     pseudoephedrine (SUDAFED) 120 mg 12 hr tablet; Take 1 tablet (120 mg total) by mouth 2 (two) times daily as needed for Congestion.  Dispense: 20  tablet; Refill: 0  -     albuterol (PROVENTIL/VENTOLIN HFA) 90 mcg/actuation inhaler; Inhale 2 puffs into the lungs every 4 (four) hours as needed for Wheezing or Shortness of Breath. Rescue  Dispense: 1 Inhaler; Refill: 1  -     promethazine-codeine 6.25-10 mg/5 ml (PHENERGAN WITH CODEINE) 6.25-10 mg/5 mL syrup; Take 5 mLs by mouth every 4 (four) hours as needed for Cough.  Dispense: 118 mL; Refill: 0      Patient Instructions     Bronchitis, Viral (Adult)    You have a viral bronchitis. Bronchitis is inflammation and swelling of the lining of the lungs. This is often caused by an infection. Symptoms include a dry, hacking cough that is worse at night. The cough may bring up yellow-green mucus. You may also feel short of breath or wheeze. Other symptoms may include tiredness, chest discomfort, and chills.  Bronchitis that is caused by a virus is not treated with antibiotics. Instead, medicines may be given to help relieve symptoms. Symptoms can last up to 2 weeks, although the cough may last much longer.  This illness is contagious during the first few days and is spread through the air by coughing and sneezing, or by direct contact (touching the sick person and then touching your own eyes, nose, or mouth).  Most viral illnesses resolve within 10 to 14 days with rest and simple home remedies, although they may sometimes last for several weeks.  Home care  · If symptoms are severe, rest at home for the first 2 to 3 days. When you go back to your usual activities, don't let yourself get too tired.  · Do not smoke. Also avoid being exposed to secondhand smoke.  · You may use over-the-counter medicine to control fever or pain, unless another pain medicine was prescribed. (Note: If you have chronic liver or kidney disease or have ever had a stomach ulcer or gastrointestinal bleeding, talk with your healthcare provider before using these medicines. Also talk to your provider if you are taking medicine to prevent blood  clots.) Aspirin should never be given to anyone younger than 18 years of age who is ill with a viral infection or fever. It may cause severe liver or brain damage.  · Your appetite may be poor, so a light diet is fine. Avoid dehydration by drinking 6 to 8 glasses of fluids per day (such as water, soft drinks, sports drinks, juices, tea, or soup). Extra fluids will help loosen secretions in the nose and lungs.  · Over-the-counter cough, cold, and sore-throat medicines will not shorten the length of the illness, but they may help to reduce symptoms. (Note: Do not use decongestants if you have high blood pressure.)  Follow-up care  Follow up with your healthcare provider, or as advised. If you had an X-ray or ECG (electrocardiogram), a specialist will review it. You will be notified of any new findings that may affect your care.  Note: If you are age 65 or older, or if you have a chronic lung disease or condition that affects your immune system, or you smoke, talk to your healthcare provider about having pneumococcal vaccinations and a yearly influenza vaccination (flu shot).  When to seek medical advice  Call your healthcare provider right away if any of these occur:  · Fever of 100.4°F (38°C) or higher  · Coughing up increased amounts of colored sputum  · Weakness, drowsiness, headache, facial pain, ear pain, or a stiff neck  Call 911, or get immediate medical care  Contact emergency services right away if any of these occur:  · Coughing up blood  · Worsening weakness, drowsiness, headache, or stiff neck  · Trouble breathing, wheezing, or pain with breathing  Date Last Reviewed: 9/13/2015 © 2000-2017 [employer] [address] [place] [address], [place] [address]. All rights reserved. This information is not intended as a substitute for professional medical care. Always follow your healthcare professional's instructions.

## 2018-12-08 NOTE — PATIENT INSTRUCTIONS
Bronchitis, Viral (Adult)    You have a viral bronchitis. Bronchitis is inflammation and swelling of the lining of the lungs. This is often caused by an infection. Symptoms include a dry, hacking cough that is worse at night. The cough may bring up yellow-green mucus. You may also feel short of breath or wheeze. Other symptoms may include tiredness, chest discomfort, and chills.  Bronchitis that is caused by a virus is not treated with antibiotics. Instead, medicines may be given to help relieve symptoms. Symptoms can last up to 2 weeks, although the cough may last much longer.  This illness is contagious during the first few days and is spread through the air by coughing and sneezing, or by direct contact (touching the sick person and then touching your own eyes, nose, or mouth).  Most viral illnesses resolve within 10 to 14 days with rest and simple home remedies, although they may sometimes last for several weeks.  Home care  · If symptoms are severe, rest at home for the first 2 to 3 days. When you go back to your usual activities, don't let yourself get too tired.  · Do not smoke. Also avoid being exposed to secondhand smoke.  · You may use over-the-counter medicine to control fever or pain, unless another pain medicine was prescribed. (Note: If you have chronic liver or kidney disease or have ever had a stomach ulcer or gastrointestinal bleeding, talk with your healthcare provider before using these medicines. Also talk to your provider if you are taking medicine to prevent blood clots.) Aspirin should never be given to anyone younger than 18 years of age who is ill with a viral infection or fever. It may cause severe liver or brain damage.  · Your appetite may be poor, so a light diet is fine. Avoid dehydration by drinking 6 to 8 glasses of fluids per day (such as water, soft drinks, sports drinks, juices, tea, or soup). Extra fluids will help loosen secretions in the nose and lungs.  · Over-the-counter  cough, cold, and sore-throat medicines will not shorten the length of the illness, but they may help to reduce symptoms. (Note: Do not use decongestants if you have high blood pressure.)  Follow-up care  Follow up with your healthcare provider, or as advised. If you had an X-ray or ECG (electrocardiogram), a specialist will review it. You will be notified of any new findings that may affect your care.  Note: If you are age 65 or older, or if you have a chronic lung disease or condition that affects your immune system, or you smoke, talk to your healthcare provider about having pneumococcal vaccinations and a yearly influenza vaccination (flu shot).  When to seek medical advice  Call your healthcare provider right away if any of these occur:  · Fever of 100.4°F (38°C) or higher  · Coughing up increased amounts of colored sputum  · Weakness, drowsiness, headache, facial pain, ear pain, or a stiff neck  Call 911, or get immediate medical care  Contact emergency services right away if any of these occur:  · Coughing up blood  · Worsening weakness, drowsiness, headache, or stiff neck  · Trouble breathing, wheezing, or pain with breathing  Date Last Reviewed: 9/13/2015  © 2232-6274 [employer]. [address] [place] [address], [place] [address]. All rights reserved. This information is not intended as a substitute for professional medical care. Always follow your healthcare professional's instructions.

## 2018-12-10 ENCOUNTER — TELEPHONE (OUTPATIENT)
Dept: ENDOSCOPY | Facility: HOSPITAL | Age: 43
End: 2018-12-10

## 2018-12-10 ENCOUNTER — OFFICE VISIT (OUTPATIENT)
Dept: GASTROENTEROLOGY | Facility: CLINIC | Age: 43
End: 2018-12-10
Payer: COMMERCIAL

## 2018-12-10 VITALS
DIASTOLIC BLOOD PRESSURE: 89 MMHG | BODY MASS INDEX: 42.53 KG/M2 | HEIGHT: 67 IN | WEIGHT: 271 LBS | HEART RATE: 52 BPM | SYSTOLIC BLOOD PRESSURE: 135 MMHG

## 2018-12-10 DIAGNOSIS — R10.84 GENERALIZED ABDOMINAL PAIN: ICD-10-CM

## 2018-12-10 DIAGNOSIS — Z12.11 SPECIAL SCREENING FOR MALIGNANT NEOPLASMS, COLON: Primary | ICD-10-CM

## 2018-12-10 DIAGNOSIS — K62.5 RECTAL BLEEDING: Primary | ICD-10-CM

## 2018-12-10 PROCEDURE — 99204 OFFICE O/P NEW MOD 45 MIN: CPT | Mod: S$GLB,,, | Performed by: PHYSICIAN ASSISTANT

## 2018-12-10 PROCEDURE — 99999 PR PBB SHADOW E&M-EST. PATIENT-LVL V: CPT | Mod: PBBFAC,,, | Performed by: PHYSICIAN ASSISTANT

## 2018-12-10 PROCEDURE — 3008F BODY MASS INDEX DOCD: CPT | Mod: CPTII,S$GLB,, | Performed by: PHYSICIAN ASSISTANT

## 2018-12-10 RX ORDER — POLYETHYLENE GLYCOL 3350, SODIUM SULFATE ANHYDROUS, SODIUM BICARBONATE, SODIUM CHLORIDE, POTASSIUM CHLORIDE 236; 22.74; 6.74; 5.86; 2.97 G/4L; G/4L; G/4L; G/4L; G/4L
4 POWDER, FOR SOLUTION ORAL ONCE
Qty: 4000 ML | Refills: 0 | Status: SHIPPED | OUTPATIENT
Start: 2018-12-10 | End: 2018-12-11

## 2018-12-10 RX ORDER — HYDROCORTISONE ACETATE 25 MG/1
25 SUPPOSITORY RECTAL 2 TIMES DAILY
Qty: 24 SUPPOSITORY | Refills: 0 | Status: SHIPPED | OUTPATIENT
Start: 2018-12-10 | End: 2020-03-11

## 2018-12-10 NOTE — PROGRESS NOTES
[facility] Consultation Note    Reason for Consult:  The primary encounter diagnosis was Rectal bleeding. A diagnosis of Generalized abdominal pain was also pertinent to this visit.    PCP:   Primary Doctor No   [address] / [place] [address]    Referring MD:  Bakari Matso Md  [address] [place] [address]    HPI:  This is a 43 y.o. female here for evaluation of rectal bleeding    Started seeing blood in the stool 11/30/18 x 8 days  Saw a large amount of blood in the stool   Saw small amount of blood in stool with wiping  No straining  Was passing thin loose stool   Some assoc lower abdominal pain after a BM, 3/10, no pain today, no rectal pain    Hemorrhoids 18yr ago during pregnancy  15yrs ago had hemorrhoidal issues, which resolved with fiber and probiotics    Normal bowel habits is loose or formed depends on how much water she drinks    Denies frequent use of mobic- has only take it twice    Mom had Hx ischemic colitis    [place] screening- Family Hx of:  Colon cancer-no    ROS:  Constitutional: No fevers, chills, No weight loss  ENT: No allergies  CV: No chest pain  Pulm: No cough, No shortness of breath  Ophtho: No vision changes  GI: see HPI  Derm: No rash  Heme: No lymphadenopathy, No bruising  MSK: No arthritis  : No dysuria, No hematuria  Endo: No hot or cold intolerance  Neuro: No syncope, No seizure  Psych: No anxiety, No depression    Medical History:  has a past medical history of Migraine headache.    Surgical History:  has a past surgical history that includes  section, classic; Cervical cerclage; Hysterectomy; and HYSTERECTOMY-ROBOT-DA GURJIT ([facility]) (N/A, 2014).    Family History: family history includes Breast cancer in her mother; Diabetes in her paternal grandmother; Heart disease in her father..     Social History:  reports that  has never smoked. she has never used smokeless tobacco. She reports that she does not drink alcohol or  "use drugs.    Review of patient's allergies indicates:   Allergen Reactions    Rocephin [ceftriaxone] Hives and Itching       Current Outpatient Medications on File Prior to Visit   Medication Sig Dispense Refill    albuterol (PROVENTIL/VENTOLIN HFA) 90 mcg/actuation inhaler Inhale 2 puffs into the lungs every 4 (four) hours as needed for Wheezing or Shortness of Breath. Rescue 1 Inhaler 1    BIOTIN ORAL Take 200 mg by mouth once daily.      meloxicam (MOBIC) 15 MG tablet Take 1 tablet (15 mg total) by mouth once daily. 30 tablet 0    multivitamin (ONE DAILY MULTIVITAMIN) per tablet Take 1 tablet by mouth once daily.      oxymetazoline (AFRIN) 0.05 % nasal spray 2 sprays by Nasal route 2 (two) times daily. for 3 days 15 mL 0    promethazine-codeine 6.25-10 mg/5 ml (PHENERGAN WITH CODEINE) 6.25-10 mg/5 mL syrup Take 5 mLs by mouth every 4 (four) hours as needed for Cough. 118 mL 0    pseudoephedrine (SUDAFED) 120 mg 12 hr tablet Take 1 tablet (120 mg total) by mouth 2 (two) times daily as needed for Congestion. 20 tablet 0    benzonatate (TESSALON) 100 MG capsule Take 1 capsule (100 mg total) by mouth every 4 (four) hours as needed for Cough. 30 capsule 1    sumatriptan (IMITREX) 25 MG Tab Take 50 mg by mouth every 2 (two) hours as needed.       No current facility-administered medications on file prior to visit.          Objective Findings:    Vital Signs:  /89   Pulse (!) 52   Ht 5' 7" (1.702 m)   Wt 122.9 kg (271 lb)   LMP 02/14/2014   BMI 42.44 kg/m²   Body mass index is 42.44 kg/m².    Physical Exam:  General Appearance: Well appearing in no acute distress  Head:   Normocephalic, without obvious abnormality  Eyes:    No scleral icterus  ENT: Neck supple, Lips, mucosa, and tongue normal  Lungs: CTA bilaterally in anterior and posterior fields, no wheezes, no crackles.  Heart:  Regular rate and rhythm, S1, S2 normal, no murmurs heard  Abdomen: Soft, non tender, non distended with positive " bowel sounds in all four quadrants.   Extremities: no edema  Skin: No rash  Neurologic: AAO x 3    Rectal exam was deferred to future CRS appt and colonoscopy    Labs:  Lab Results   Component Value Date    WBC 6.15 12/03/2018    HGB 12.5 12/03/2018    HCT 40.3 12/03/2018     12/03/2018    CHOL 190 07/06/2011    TRIG 56 07/06/2011    HDL 83 (H) 07/06/2011    ALT 16 12/03/2018    AST 14 12/03/2018     12/03/2018    K 3.9 12/03/2018     12/03/2018    CREATININE 0.8 12/03/2018    BUN 11 12/03/2018    CO2 28 12/03/2018    TSH 2.06 07/06/2011    INR 1.0 04/03/2014       Imaging:    Endoscopy:    none  Assessment:  1. Rectal bleeding    2. Generalized abdominal pain       42yo F with blood in the stool x 9 days. Mild intermittent assoc abdominal cramping. Abdomen non tender on exam.     Recommendations:  1. Schedule colonoscopy to rule out sources of bleeding and malignancies. - with CRS     2. F/u with CRS after colonoscopy     3. anusol supp until further eval    Follow-up if symptoms worsen or fail to improve.      Order summary:  Orders Placed This Encounter    Ambulatory Referral to Colorectal Surgery    hydrocortisone (ANUSOL-HC) 25 mg suppository    Case request GI: COLONOSCOPY         Thank you so much for allowing me to participate in the care of Susanne Maya PA-C

## 2018-12-10 NOTE — LETTER
December 10, 2018      Bakari Matos MD  [address]  [place] [address]           [facility]  [address]  [place] [address]  Phone: [phone]  Fax: [phone]          Patient: Susanne Marin   MR Number: [id number]   YOB: 1975   Date of Visit: 12/10/2018       Dear Dr. Bakari Matos:    Thank you for referring Susanne Marin to me for evaluation. Attached you will find relevant portions of my assessment and plan of care.    If you have questions, please do not hesitate to call me. I look forward to following Susanne Marin along with you.    Sincerely,    Sin Maya PA-C    Enclosure  CC:  No Recipients    If you would like to receive this communication electronically, please contact [email or web address] or [phone] to request more information on [employer] access.    For providers and/or their staff who would like to refer a patient to [facility], please contact us through our one-stop-shop provider referral line, [facility], at [phone].    If you feel you have received this communication in error or would no longer like to receive these types of communications, please e-mail [email or web address]

## 2018-12-12 ENCOUNTER — HOSPITAL ENCOUNTER (OUTPATIENT)
Facility: HOSPITAL | Age: 43
Discharge: HOME | End: 2018-12-12
Attending: COLON & RECTAL SURGERY | Admitting: COLON & RECTAL SURGERY
Payer: COMMERCIAL

## 2018-12-12 ENCOUNTER — ANESTHESIA (OUTPATIENT)
Dept: ENDOSCOPY | Facility: HOSPITAL | Age: 43
End: 2018-12-12
Payer: COMMERCIAL

## 2018-12-12 ENCOUNTER — TELEPHONE (OUTPATIENT)
Dept: SURGERY | Facility: CLINIC | Age: 43
End: 2018-12-12

## 2018-12-12 ENCOUNTER — ANESTHESIA EVENT (OUTPATIENT)
Dept: ENDOSCOPY | Facility: HOSPITAL | Age: 43
End: 2018-12-12
Payer: COMMERCIAL

## 2018-12-12 VITALS
HEART RATE: 55 BPM | BODY MASS INDEX: 41.59 KG/M2 | OXYGEN SATURATION: 98 % | RESPIRATION RATE: 14 BRPM | SYSTOLIC BLOOD PRESSURE: 131 MMHG | WEIGHT: 265 LBS | HEIGHT: 67 IN | TEMPERATURE: 98 F | DIASTOLIC BLOOD PRESSURE: 76 MMHG

## 2018-12-12 DIAGNOSIS — Z12.11 COLON CANCER SCREENING: Primary | ICD-10-CM

## 2018-12-12 PROCEDURE — 25000003 PHARM REV CODE 250: Performed by: NURSE ANESTHETIST, CERTIFIED REGISTERED

## 2018-12-12 PROCEDURE — 63600175 PHARM REV CODE 636 W HCPCS: Performed by: NURSE ANESTHETIST, CERTIFIED REGISTERED

## 2018-12-12 PROCEDURE — 88305 TISSUE EXAM BY PATHOLOGIST: CPT | Performed by: PATHOLOGY

## 2018-12-12 PROCEDURE — 37000008 HC ANESTHESIA 1ST 15 MINUTES: Performed by: COLON & RECTAL SURGERY

## 2018-12-12 PROCEDURE — 45380 COLONOSCOPY AND BIOPSY: CPT | Performed by: COLON & RECTAL SURGERY

## 2018-12-12 PROCEDURE — 88305 TISSUE EXAM BY PATHOLOGIST: CPT | Mod: 26,,, | Performed by: PATHOLOGY

## 2018-12-12 PROCEDURE — 37000009 HC ANESTHESIA EA ADD 15 MINS: Performed by: COLON & RECTAL SURGERY

## 2018-12-12 PROCEDURE — 45380 COLONOSCOPY AND BIOPSY: CPT | Mod: 33,,, | Performed by: COLON & RECTAL SURGERY

## 2018-12-12 PROCEDURE — 27201012 HC FORCEPS, HOT/COLD, DISP: Performed by: COLON & RECTAL SURGERY

## 2018-12-12 PROCEDURE — E9220 PRA ENDO ANESTHESIA: HCPCS | Mod: 33,,, | Performed by: NURSE ANESTHETIST, CERTIFIED REGISTERED

## 2018-12-12 RX ORDER — SODIUM CHLORIDE 9 MG/ML
INJECTION, SOLUTION INTRAVENOUS CONTINUOUS PRN
Status: DISCONTINUED | OUTPATIENT
Start: 2018-12-12 | End: 2018-12-12

## 2018-12-12 RX ORDER — PROPOFOL 10 MG/ML
VIAL (ML) INTRAVENOUS
Status: DISCONTINUED | OUTPATIENT
Start: 2018-12-12 | End: 2018-12-12

## 2018-12-12 RX ORDER — LIDOCAINE HCL/PF 100 MG/5ML
SYRINGE (ML) INTRAVENOUS
Status: DISCONTINUED | OUTPATIENT
Start: 2018-12-12 | End: 2018-12-12

## 2018-12-12 RX ORDER — GLYCOPYRROLATE 0.2 MG/ML
INJECTION INTRAMUSCULAR; INTRAVENOUS
Status: DISCONTINUED | OUTPATIENT
Start: 2018-12-12 | End: 2018-12-12

## 2018-12-12 RX ORDER — PROPOFOL 10 MG/ML
VIAL (ML) INTRAVENOUS CONTINUOUS PRN
Status: DISCONTINUED | OUTPATIENT
Start: 2018-12-12 | End: 2018-12-12

## 2018-12-12 RX ADMIN — LIDOCAINE HYDROCHLORIDE 100 MG: 20 INJECTION, SOLUTION INTRAVENOUS at 09:12

## 2018-12-12 RX ADMIN — PROPOFOL 100 MG: 10 INJECTION, EMULSION INTRAVENOUS at 09:12

## 2018-12-12 RX ADMIN — SODIUM CHLORIDE: 9 INJECTION, SOLUTION INTRAVENOUS at 08:12

## 2018-12-12 RX ADMIN — PROPOFOL 200 MCG/KG/MIN: 10 INJECTION, EMULSION INTRAVENOUS at 09:12

## 2018-12-12 RX ADMIN — GLYCOPYRROLATE 0.2 MG: 0.2 INJECTION, SOLUTION INTRAMUSCULAR; INTRAVENOUS at 09:12

## 2018-12-12 NOTE — ANESTHESIA PREPROCEDURE EVALUATION
12/12/2018  Susanne Marin is a 43 y.o., female with rectal bleeding here for colonoscopy.    Past Medical History:   Diagnosis Date    Migraine headache      Lab Results   Component Value Date    WBC 6.15 12/03/2018    HGB 12.5 12/03/2018    HCT 40.3 12/03/2018    MCV 86 12/03/2018     12/03/2018       Chemistry        Component Value Date/Time     12/03/2018 1548    K 3.9 12/03/2018 1548     12/03/2018 1548    CO2 28 12/03/2018 1548    BUN 11 12/03/2018 1548    CREATININE 0.8 12/03/2018 1548    GLU 81 12/03/2018 1548        Component Value Date/Time    CALCIUM 9.2 12/03/2018 1548    ALKPHOS 83 12/03/2018 1548    AST 14 12/03/2018 1548    ALT 16 12/03/2018 1548    BILITOT 0.3 12/03/2018 1548    ESTGFRAFRICA >60 12/03/2018 1548    EGFRNONAA >60 12/03/2018 1548            Pre-op Assessment    I have reviewed the Patient Summary Reports.     I have reviewed the Nursing Notes.   I have reviewed the Medications.     Review of Systems  Anesthesia Hx:  Denies Family Hx of Anesthesia complications.   Denies Personal Hx of Anesthesia complications.   Cardiovascular:   Exercise tolerance: good    Pulmonary:   Childhood asthma, no sx's now   Hepatic/GI:   60 lb wt loss on adipex   Neurological:   Headaches Infrequent migraines       Physical Exam  General:  Obesity    Airway/Jaw/Neck:  Airway Findings: Mallampati: II     Dental:  Dental Findings: In tact        Mental Status:  Mental Status Findings:  Cooperative, Alert and Oriented         Anesthesia Plan  Type of Anesthesia, risks & benefits discussed:  Anesthesia Type:  general  Patient's Preference:   Intra-op Monitoring Plan:   Intra-op Monitoring Plan Comments:   Post Op Pain Control Plan:   Post Op Pain Control Plan Comments:   Induction:   IV and Inhalation  Beta Blocker:         Informed Consent: Patient understands risks and agrees with  Anesthesia plan.  Questions answered. Anesthesia consent signed with patient.  ASA Score: 2     Day of Surgery Review of History & Physical:    H&P update referred to the surgeon.         Ready For Surgery From Anesthesia Perspective.

## 2018-12-12 NOTE — PROVATION PATIENT INSTRUCTIONS
Discharge Summary/Instructions after an Endoscopic Procedure  Patient Name: Susanne Marin  Patient MRN: [id number]  Patient YOB: 1975 Wednesday, December 12, 2018  Viet Telles MD  RESTRICTIONS:  During your procedure today, you received medications for sedation.  These   medications may affect your judgment, balance and coordination.  Therefore,   for 24 hours, you have the following restrictions:   - DO NOT drive a car, operate machinery, make legal/financial decisions,   sign important papers or drink alcohol.    ACTIVITY:  Today: no heavy lifting, straining or running due to procedural   sedation/anesthesia.  The following day: return to full activity including work.  DIET:  Eat and drink normally unless instructed otherwise.     TREATMENT FOR COMMON SIDE EFFECTS:  - Mild abdominal pain, nausea, belching, bloating or excessive gas:  rest,   eat lightly and use a heating pad.  - Sore Throat: treat with throat lozenges and/or gargle with warm salt   water.  - Because air was used during the procedure, expelling large amounts of air   from your rectum or belching is normal.  - If a bowel prep was taken, you may not have a bowel movement for 1-3 days.    This is normal.  SYMPTOMS TO WATCH FOR AND REPORT TO YOUR PHYSICIAN:  1. Abdominal pain or bloating, other than gas cramps.  2. Chest pain.  3. Back pain.  4. Signs of infection such as: chills or fever occurring within 24 hours   after the procedure.  5. Rectal bleeding, which would show as bright red, maroon, or black stools.   (A tablespoon of blood from the rectum is not serious, especially if   hemorrhoids are present.)  6. Vomiting.  7. Weakness or dizziness.  GO DIRECTLY TO THE NEAREST EMERGENCY ROOM IF YOU HAVE ANY OF THE FOLLOWING:      Difficulty breathing              Chills and/or fever over 101 F   Persistent vomiting and/or vomiting blood   Severe abdominal pain   Severe chest pain   Black, tarry stools   Bleeding- more than one  tablespoon   Any other symptom or condition that you feel may need urgent attention  Your doctor recommends these additional instructions:  If any biopsies were taken, your doctors clinic will contact you in 1 to 2   weeks with any results.  - Discharge patient to home (ambulatory).   - High fiber diet daily.   - Await pathology results.   - Repeat colonoscopy is recommended.  The colonoscopy date will be   determined after pathology results from today's exam become available for   review.  For questions, problems or results please call your physician - Viet Telles MD at Work:  [phone].  [facility], EMERGENCY ROOM PHONE NUMBER: [phone]  IF A COMPLICATION OR EMERGENCY SITUATION ARISES AND YOU ARE UNABLE TO REACH   YOUR PHYSICIAN - GO DIRECTLY TO THE EMERGENCY ROOM.  Viet Telles MD  12/12/2018 9:19:57 AM  This report has been verified and signed electronically.  [employer]

## 2018-12-12 NOTE — TELEPHONE ENCOUNTER
----- Message from Marlena Pittman sent at 12/12/2018 10:16 AM CST -----  Contact: Pt:[phone]  .Needs Advice    Reason for call:Pt called and states she would like to know if she really needs to come in for her appointment on today because she had a colonoscopy with  already.         Communication Preference:Pt:[phone]    Additional Information:

## 2018-12-12 NOTE — DISCHARGE INSTRUCTIONS
Colonoscopy     A camera attached to a flexible tube with a viewing lens is used to take video pictures.     Colonoscopy is a test to view the inside of your lower digestive tract (colon and rectum). Sometimes it can show the last part of the small intestine (ileum). During the test, small pieces of tissue may be removed for testing. This is called a biopsy. Small growths, such as polyps, may also be removed.   Why is colonoscopy done?  The test is done to help look for colon cancer. And it can help find the source of abdominal pain, bleeding, and changes in bowel habits. It may be needed once a year, depending on factors such as your:  · Age  · Health history  · Family health history  · Symptoms  · Results from any prior colonoscopy  Risks and possible complications  These include:  · Bleeding               · A puncture or tear in the colon   · Risks of anesthesia  · A cancer lesion not being seen  Getting ready   To prepare for the test:  · Talk with your healthcare provider about the risks of the test (see below). Also ask your healthcare provider about alternatives to the test.  · Tell your healthcare provider about any medicines you take. Also tell him or her about any health conditions you may have.  · Make sure your rectum and colon are empty for the test. Follow the diet and bowel prep instructions exactly. If you dont, the test may need to be rescheduled.  · Plan for a friend or family member to drive you home after the test.     Colonoscopy provides an inside view of the entire colon.     You may discuss the results with your doctor right away or at a future visit.  During the test   The test is usually done in the hospital on an outpatient basis. This means you go home the same day. The procedure takes about 30 minutes. During that time:  · You are given relaxing (sedating) medicine through an IV line. You may be drowsy, or fully asleep.  · The healthcare provider will first give you a physical exam to  check for anal and rectal problems.  · Then the anus is lubricated and the scope inserted.  · If you are awake, you may have a feeling similar to needing to have a bowel movement. You may also feel pressure as air is pumped into the colon. Its OK to pass gas during the procedure.  · Biopsy, polyp removal, or other treatments may be done during the test.  After the test   You may have gas right after the test. It can help to try to pass it to help prevent later bloating. Your healthcare provider may discuss the results with you right away. Or you may need to schedule a follow-up visit to talk about the results. After the test, you can go back to your normal eating and other activities. You may be tired from the sedation and need to rest for a few hours.  Date Last Reviewed: 11/1/2016 © 2000-2017 [employer]. [address] [place] [address], [place] [address]. All rights reserved. This information is not intended as a substitute for professional medical care. Always follow your healthcare professional's instructions.

## 2018-12-12 NOTE — TRANSFER OF CARE
"Anesthesia Transfer of Care Note    Patient: Susanne Marin    Procedure(s) Performed: Procedure(s) (LRB):  COLONOSCOPY (N/A)    Patient location: PACU    Anesthesia Type: general    Transport from OR: Transported from OR on room air with adequate spontaneous ventilation    Post pain: adequate analgesia    Post assessment: no apparent anesthetic complications and tolerated procedure well    Post vital signs: stable    Level of consciousness: awake, alert and oriented    Nausea/Vomiting: no nausea/vomiting    Complications: none    Transfer of care protocol was followed      Last vitals:   Visit Vitals  BP (!) 105/58 (BP Location: Right arm, Patient Position: Sitting)   Pulse 77   Temp 36.7 °C (98.1 °F) (Temporal)   Resp 14   Ht 5' 7" (1.702 m)   Wt 120.2 kg (265 lb)   LMP 02/14/2014   SpO2 100%   Breastfeeding? No   BMI 41.50 kg/m²     "

## 2018-12-12 NOTE — ANESTHESIA POSTPROCEDURE EVALUATION
"Anesthesia Post Evaluation    Patient: Susanne Marin    Procedure(s) Performed: Procedure(s) (LRB):  COLONOSCOPY (N/A)    Final Anesthesia Type: general  Patient location during evaluation: GI PACU  Patient participation: Yes- Able to Participate  Level of consciousness: awake and alert  Post-procedure vital signs: reviewed and stable  Pain management: adequate  Airway patency: patent  PONV status at discharge: No PONV  Anesthetic complications: no      Cardiovascular status: blood pressure returned to baseline  Respiratory status: spontaneous ventilation  Hydration status: euvolemic  Follow-up not needed.        Visit Vitals  /67 (BP Location: Left arm, Patient Position: Lying)   Pulse (!) 52   Temp 36.7 °C (98.1 °F) (Temporal)   Resp 14   Ht 5' 7" (1.702 m)   Wt 120.2 kg (265 lb)   LMP 02/14/2014   SpO2 100%   Breastfeeding? No   BMI 41.50 kg/m²       Pain/Simon Score: Simon Score: 10 (12/12/2018  9:38 AM)        "

## 2018-12-12 NOTE — H&P
Endoscopy H&P    Procedure : Colonoscopy      asymptomatic screening exam      Past Medical History:   Diagnosis Date    Migraine headache              Review of Systems -ROS:  GENERAL: No fever, chills, fatigability or weight loss.  CHEST: Denies MACKENZIE, cyanosis, wheezing, cough and sputum production.  CARDIOVASCULAR: Denies chest pain, PND, orthopnea or reduced exercise tolerance.   Musculoskeletal ROS: negative for - gait disturbance or joint pain  Neurological ROS: negative for - confusion or memory loss        Physical Exam:  General: well developed, well nourished, no distress  Head: normocephalic  Neck: supple, symmetrical, trachea midline  Lungs:  clear to auscultation bilaterally and normal respiratory effort  Heart: regular rate and rhythm, S1, S2 normal, no murmur, rub or gallop and regular rate and rhythm  Abdomen: soft, non-tender non-distented; bowel sounds normal; no masses,  no organomegaly  Extremities: no cyanosis or edema, or clubbing       Moderate Sedation (choice): Mallampati Score 1    ASA : II    IMP: asymptomatic screening exam    Plan: Colonoscopy with Moderate sedation.  I have explained the procedure including indications, alternatives, expected outcomes and potential complications. The patient appears to understand and gives informed consent. The patient is medically ready for surgery.

## 2018-12-19 ENCOUNTER — TELEPHONE (OUTPATIENT)
Dept: ENDOSCOPY | Facility: HOSPITAL | Age: 43
End: 2018-12-19

## 2019-12-19 ENCOUNTER — HOSPITAL ENCOUNTER (EMERGENCY)
Facility: HOSPITAL | Age: 44
Discharge: HOME | End: 2019-12-19
Attending: EMERGENCY MEDICINE
Payer: COMMERCIAL

## 2019-12-19 VITALS
HEIGHT: 66 IN | HEART RATE: 78 BPM | DIASTOLIC BLOOD PRESSURE: 89 MMHG | SYSTOLIC BLOOD PRESSURE: 132 MMHG | BODY MASS INDEX: 47.09 KG/M2 | TEMPERATURE: 98 F | RESPIRATION RATE: 16 BRPM | OXYGEN SATURATION: 96 % | WEIGHT: 293 LBS

## 2019-12-19 DIAGNOSIS — R09.1 PLEURISY: Primary | ICD-10-CM

## 2019-12-19 DIAGNOSIS — R07.9 CHEST PAIN: ICD-10-CM

## 2019-12-19 LAB
ALBUMIN SERPL BCP-MCNC: 3.5 G/DL (ref 3.5–5.2)
ALP SERPL-CCNC: 114 U/L (ref 55–135)
ALT SERPL W/O P-5'-P-CCNC: 24 U/L (ref 10–44)
ANION GAP SERPL CALC-SCNC: 10 MMOL/L (ref 8–16)
AST SERPL-CCNC: 19 U/L (ref 10–40)
BASOPHILS # BLD AUTO: 0.05 K/UL (ref 0–0.2)
BASOPHILS NFR BLD: 0.8 % (ref 0–1.9)
BILIRUB SERPL-MCNC: 0.2 MG/DL (ref 0.1–1)
BNP SERPL-MCNC: <10 PG/ML (ref 0–99)
BUN SERPL-MCNC: 13 MG/DL (ref 6–20)
CALCIUM SERPL-MCNC: 9.4 MG/DL (ref 8.7–10.5)
CHLORIDE SERPL-SCNC: 110 MMOL/L (ref 95–110)
CO2 SERPL-SCNC: 23 MMOL/L (ref 23–29)
CREAT SERPL-MCNC: 0.8 MG/DL (ref 0.5–1.4)
DIFFERENTIAL METHOD: ABNORMAL
EOSINOPHIL # BLD AUTO: 0.2 K/UL (ref 0–0.5)
EOSINOPHIL NFR BLD: 3 % (ref 0–8)
ERYTHROCYTE [DISTWIDTH] IN BLOOD BY AUTOMATED COUNT: 13.4 % (ref 11.5–14.5)
EST. GFR  (AFRICAN AMERICAN): >60 ML/MIN/1.73 M^2
EST. GFR  (NON AFRICAN AMERICAN): >60 ML/MIN/1.73 M^2
GLUCOSE SERPL-MCNC: 74 MG/DL (ref 70–110)
HCT VFR BLD AUTO: 44.9 % (ref 37–48.5)
HGB BLD-MCNC: 14 G/DL (ref 12–16)
IMM GRANULOCYTES # BLD AUTO: 0.01 K/UL (ref 0–0.04)
IMM GRANULOCYTES NFR BLD AUTO: 0.2 % (ref 0–0.5)
LYMPHOCYTES # BLD AUTO: 2.4 K/UL (ref 1–4.8)
LYMPHOCYTES NFR BLD: 40.4 % (ref 18–48)
MCH RBC QN AUTO: 27 PG (ref 27–31)
MCHC RBC AUTO-ENTMCNC: 31.2 G/DL (ref 32–36)
MCV RBC AUTO: 87 FL (ref 82–98)
MONOCYTES # BLD AUTO: 0.5 K/UL (ref 0.3–1)
MONOCYTES NFR BLD: 8.9 % (ref 4–15)
NEUTROPHILS # BLD AUTO: 2.8 K/UL (ref 1.8–7.7)
NEUTROPHILS NFR BLD: 46.7 % (ref 38–73)
NRBC BLD-RTO: 0 /100 WBC
PLATELET # BLD AUTO: 263 K/UL (ref 150–350)
PMV BLD AUTO: 11.6 FL (ref 9.2–12.9)
POTASSIUM SERPL-SCNC: 4.1 MMOL/L (ref 3.5–5.1)
PROT SERPL-MCNC: 7.3 G/DL (ref 6–8.4)
RBC # BLD AUTO: 5.19 M/UL (ref 4–5.4)
SODIUM SERPL-SCNC: 143 MMOL/L (ref 136–145)
TROPONIN I SERPL DL<=0.01 NG/ML-MCNC: 0.01 NG/ML (ref 0–0.03)
WBC # BLD AUTO: 5.97 K/UL (ref 3.9–12.7)

## 2019-12-19 PROCEDURE — 84484 ASSAY OF TROPONIN QUANT: CPT

## 2019-12-19 PROCEDURE — 83880 ASSAY OF NATRIURETIC PEPTIDE: CPT

## 2019-12-19 PROCEDURE — 93010 EKG 12-LEAD: ICD-10-PCS | Mod: ,,, | Performed by: INTERNAL MEDICINE

## 2019-12-19 PROCEDURE — 99285 EMERGENCY DEPT VISIT HI MDM: CPT | Mod: ,,, | Performed by: PHYSICIAN ASSISTANT

## 2019-12-19 PROCEDURE — 93010 ELECTROCARDIOGRAM REPORT: CPT | Mod: ,,, | Performed by: INTERNAL MEDICINE

## 2019-12-19 PROCEDURE — 80053 COMPREHEN METABOLIC PANEL: CPT

## 2019-12-19 PROCEDURE — 85025 COMPLETE CBC W/AUTO DIFF WBC: CPT

## 2019-12-19 PROCEDURE — 99285 EMERGENCY DEPT VISIT HI MDM: CPT | Mod: 25

## 2019-12-19 PROCEDURE — 93005 ELECTROCARDIOGRAM TRACING: CPT

## 2019-12-19 PROCEDURE — 96374 THER/PROPH/DIAG INJ IV PUSH: CPT

## 2019-12-19 PROCEDURE — 63600175 PHARM REV CODE 636 W HCPCS: Performed by: PHYSICIAN ASSISTANT

## 2019-12-19 PROCEDURE — 99285 PR EMERGENCY DEPT VISIT,LEVEL V: ICD-10-PCS | Mod: ,,, | Performed by: PHYSICIAN ASSISTANT

## 2019-12-19 RX ORDER — KETOROLAC TROMETHAMINE 30 MG/ML
10 INJECTION, SOLUTION INTRAMUSCULAR; INTRAVENOUS
Status: COMPLETED | OUTPATIENT
Start: 2019-12-19 | End: 2019-12-19

## 2019-12-19 RX ORDER — NAPROXEN 500 MG/1
500 TABLET ORAL 2 TIMES DAILY WITH MEALS
Qty: 30 TABLET | Refills: 0 | Status: SHIPPED | OUTPATIENT
Start: 2019-12-19 | End: 2020-03-11

## 2019-12-19 RX ADMIN — KETOROLAC TROMETHAMINE 10 MG: 30 INJECTION, SOLUTION INTRAMUSCULAR; INTRAVENOUS at 07:12

## 2019-12-20 NOTE — ED TRIAGE NOTES
Patient states right sided CP onset Tuesday, worse today. States pain worse with mvmt or lying down and turning. States h/o pleurisy. States she started taking Contrave diet pills yesterday,

## 2019-12-20 NOTE — EKG INTERPRETATIONS - EMERGENCY DEPT.
Independently interpreted by MD:  Rate 76, NSR, no stemi, no ectopy, no hypertrophy, nonspecific T wave changes

## 2019-12-20 NOTE — DISCHARGE INSTRUCTIONS
I suspect that your symptoms are due to pleurisy.  Your lab tests and chest x-ray today are reassuring.  I am prescribing an anti-inflammatory pain medicine to help with your symptoms.    Please schedule a follow-up appointment with your primary care doctor next week.  If you start to have worsening chest pain or shortness of breath this is concerning for clot in her lung or other serious cause, therefore you should return to the emergency department if this happens.

## 2019-12-20 NOTE — ED NOTES
Patient identifiers verified and correct for Ms Marin  C/C: right sided CP SEE NN  APPEARANCE: awake and alert in NAD.  SKIN: warm, dry and intact. No breakdown or bruising.  MUSCULOSKELETAL: Patient moving all extremities spontaneously, no obvious swelling or deformities noted. Ambulates independently.  RESPIRATORY: Positive  shortness of breath.Respirations unlabored. SOB worse last few hours  CARDIAC: Positive right CP, 2+ distal pulses; no peripheral edema  ABDOMEN: S/ND/NT, Denies nausea  : voids spontaneously, denies difficulty  Neurologic: AAO x 4; follows commands equal strength in all extremities; denies numbness/tingling. Denies dizziness Denies weakness

## 2019-12-20 NOTE — ED PROVIDER NOTES
Encounter Date: 2019       History     Chief Complaint   Patient presents with    Chest Pain     44-year-old female with history of migraines presents for chest pain for 2 days.  She describes right-sided, substernal chest pain which was initially intermittent but has been constant all day today.  She is unable to describe the quality of pain but states that feels very similar to her last episode of pleurisy.  She feels slightly short of breath and that she has difficulty taking a deep breath. She denies leg pain or swelling, nausea/vomiting, diaphoresis or abdominal pain. She has no history of DVT/PE, no history of malignancy, not on OCPs or other hormonal medications, no recent surgeries or immobilization.  She denies any personal cardiac history, her father  of an MI in his late 50s.  Nonsmoker.        Review of patient's allergies indicates:   Allergen Reactions    Rocephin [ceftriaxone] Hives and Itching     Past Medical History:   Diagnosis Date    Migraine headache      Past Surgical History:   Procedure Laterality Date    CERVICAL CERCLAGE       SECTION, CLASSIC      COLONOSCOPY N/A 2018    Procedure: COLONOSCOPY;  Surgeon: Viet Telles MD;  Location: [address];  Service: Endoscopy;  Laterality: N/A;  with CRS    HYSTERECTOMY       Family History   Problem Relation Age of Onset    Heart disease Father     Breast cancer Mother     Diabetes Paternal Grandmother     Colon cancer Neg Hx     Ovarian cancer Neg Hx     Esophageal cancer Neg Hx     Stomach cancer Neg Hx      Social History     Tobacco Use    Smoking status: Never Smoker    Smokeless tobacco: Never Used   Substance Use Topics    Alcohol use: No    Drug use: No     Review of Systems   Constitutional: Negative for chills, fatigue and fever.   Respiratory: Positive for shortness of breath. Negative for cough.    Cardiovascular: Positive for chest pain.   Gastrointestinal: Negative for abdominal pain,  nausea and vomiting.   Genitourinary: Negative for dysuria.   Musculoskeletal: Negative for back pain.   Skin: Negative for rash.   Neurological: Negative for weakness.   Hematological: Does not bruise/bleed easily.   Psychiatric/Behavioral: The patient is not nervous/anxious.        Physical Exam     Initial Vitals [12/19/19 1805]   BP Pulse Resp Temp SpO2   (!) 147/91 90 19 97.8 °F (36.6 °C) 98 %      MAP       --         Physical Exam    Nursing note and vitals reviewed.  Constitutional: She appears well-developed and well-nourished. She is not diaphoretic. No distress.   HENT:   Head: Normocephalic and atraumatic.   Eyes: EOM are normal. Pupils are equal, round, and reactive to light.   Neck: Normal range of motion. Neck supple.   Cardiovascular: Normal rate, regular rhythm, normal heart sounds and intact distal pulses. Exam reveals no gallop and no friction rub.    No murmur heard.  No lower extremity edema, erythema, tenderness or warmth   Pulmonary/Chest: Breath sounds normal. No respiratory distress. She has no wheezes. She has no rhonchi. She has no rales. She exhibits no tenderness.   Abdominal: Soft. Bowel sounds are normal. She exhibits no distension and no mass. There is no tenderness. There is no rebound and no guarding.   Musculoskeletal: Normal range of motion.   Neurological: She is alert and oriented to person, place, and time.   Skin: Skin is warm and dry.   Psychiatric: She has a normal mood and affect.         ED Course   Procedures  Labs Reviewed   CBC W/ AUTO DIFFERENTIAL - Abnormal; Notable for the following components:       Result Value    Mean Corpuscular Hemoglobin Conc 31.2 (*)     All other components within normal limits    Narrative:     shared lav   B-TYPE NATRIURETIC PEPTIDE    Narrative:     shared lav   TROPONIN I    Narrative:     shared lav   COMPREHENSIVE METABOLIC PANEL          Imaging Results          X-Ray Chest PA And Lateral (Final result)  Result time 12/19/19 19:35:05     Final result by Davi Tse MD (12/19/19 19:35:05)                 Impression:      No detrimental change or radiographic acute intrathoracic process seen.      Electronically signed by: Davi Tse MD  Date:    12/19/2019  Time:    19:35             Narrative:    EXAMINATION:  XR CHEST PA AND LATERAL    CLINICAL HISTORY:  Chest pain, unspecified    TECHNIQUE:  PA and lateral views of the chest were performed.    COMPARISON:  Chest radiograph 06/25/2017    FINDINGS:  Resolution is somewhat limited by body habitus with underpenetration.  Suspected clothing button artifact project over the lateral right upper lung zone and upper left axilla.  Cardiomediastinal silhouette is midline and stable without evidence of failure.  The lungs are well expanded without focal consolidation, pleural effusion or pneumothorax.  Osseous structures appear intact.                                 Medical Decision Making:   History:   Old Medical Records: I decided to obtain old medical records.  Old Records Summarized: records from clinic visits.       <> Summary of Records: No recent ED visits or admissions  Initial Assessment:   44-year-old female presenting for right-sided pleuritic chest pain consistent with prior episodes of pleurisy.  She is mildly hypertensive with otherwise normal vitals.  No clinical signs of DVT.  Differential Diagnosis:   Costochondritis  Pleurisy  Pneumonia  Low suspicion for ACS  I considered, however do not suspect pulmonary embolism as the patient is PERC negative  Independently Interpreted Test(s):   I have ordered and independently interpreted X-rays - see summary below.       <> Summary of X-Ray Reading(s): No consolidation or pulmonary edema  I have ordered and independently interpreted EKG Reading(s) - see prior notes  Clinical Tests:   Lab Tests: Ordered and Reviewed  Radiological Study: Ordered and Reviewed  Medical Tests: Ordered and Reviewed  ED Management:  Check labs, give Toradol, do  chest x-ray and reassess.    Patient reports complete relief of pain after Toradol.  Her vitals have normalized.  On reassessment, she is seated comfortably eating dinner.  Given her reassuring workup, I do not believe he requires further ED treatment and is stable for discharge. Will discharge with naproxen for pain. Stressed the importance of follow-up, strict ED return precautions given.  Patient voiced understanding and is comfortable with discharge. I discussed this patient with my supervising physician.    Jadyn Hickman PA-C      Additional MDM:   PERC Rule:   Age is greater than or equal to 50 = 0.0  Heart Rate is greater than or equal to 100 = 0.0  SaO2 on room air < 95% = 0.0  Unilateral leg swelling = 0.0  Hemoptysis = 0.0  Prior PE or DVT =  0.0  Hormone use = 0.00                              Clinical Impression:       ICD-10-CM ICD-9-CM   1. Pleurisy R09.1 511.0   2. Chest pain R07.9 786.50         Disposition:   Disposition: Discharged  Condition: Stable                     Jadyn Hickman PA-C  12/20/19 0001

## 2020-03-11 ENCOUNTER — OFFICE VISIT (OUTPATIENT)
Dept: SURGERY | Facility: CLINIC | Age: 45
End: 2020-03-11
Payer: COMMERCIAL

## 2020-03-11 VITALS
WEIGHT: 293 LBS | BODY MASS INDEX: 43.4 KG/M2 | SYSTOLIC BLOOD PRESSURE: 133 MMHG | HEART RATE: 74 BPM | HEIGHT: 69 IN | DIASTOLIC BLOOD PRESSURE: 82 MMHG

## 2020-03-11 DIAGNOSIS — K64.5 THROMBOSED EXTERNAL HEMORRHOIDS: Primary | ICD-10-CM

## 2020-03-11 PROCEDURE — 3008F BODY MASS INDEX DOCD: CPT | Mod: CPTII,S$GLB,, | Performed by: COLON & RECTAL SURGERY

## 2020-03-11 PROCEDURE — 99999 PR PBB SHADOW E&M-EST. PATIENT-LVL III: CPT | Mod: PBBFAC,,, | Performed by: COLON & RECTAL SURGERY

## 2020-03-11 PROCEDURE — 99999 PR PBB SHADOW E&M-EST. PATIENT-LVL III: ICD-10-PCS | Mod: PBBFAC,,, | Performed by: COLON & RECTAL SURGERY

## 2020-03-11 PROCEDURE — 99203 PR OFFICE/OUTPT VISIT, NEW, LEVL III, 30-44 MIN: ICD-10-PCS | Mod: 25,S$GLB,, | Performed by: COLON & RECTAL SURGERY

## 2020-03-11 PROCEDURE — 46600 DIAGNOSTIC ANOSCOPY SPX: CPT | Mod: S$GLB,,, | Performed by: COLON & RECTAL SURGERY

## 2020-03-11 PROCEDURE — 99203 OFFICE O/P NEW LOW 30 MIN: CPT | Mod: 25,S$GLB,, | Performed by: COLON & RECTAL SURGERY

## 2020-03-11 PROCEDURE — 3008F PR BODY MASS INDEX (BMI) DOCUMENTED: ICD-10-PCS | Mod: CPTII,S$GLB,, | Performed by: COLON & RECTAL SURGERY

## 2020-03-11 PROCEDURE — 46600 PR DIAG2STIC A2SCOPY: ICD-10-PCS | Mod: S$GLB,,, | Performed by: COLON & RECTAL SURGERY

## 2020-03-11 RX ORDER — METRONIDAZOLE 500 MG/1
TABLET ORAL
COMMUNITY
Start: 2020-02-12 | End: 2021-02-22 | Stop reason: CLARIF

## 2020-03-11 NOTE — PROGRESS NOTES
"CRS Office Visit History and Physical    Referring Md:   No referring provider defined for this encounter.    SUBJECTIVE:     Chief Complaint: Anal pain and bleeding    History of Present Illness:  Patient is a 44 y.o. female presents for evaluation of anal pain and bleeding.  This started on Edwar Gras day ().  She describes that the pain and bleeding is associated with protruding tissue which she has to manually reduce.  The initial pain was also associated with a hard swollen region that has resolved and her pain has gotten better.  The blood is on the toilet paper and occurs with every bowel movement.    Additionally, the patient reports that she has been taking a magnesium containing laxative daily as a "detox" since .  With this she has 2 liquid bowel movements daily.  At baseline prior to this she would have hard BMs every 2-3 days.  She does describe need to strain with BMs and she spends approximately 20 minutes on the toilet bowel at a time.    Last Colonoscopy: 2018 with finding of single tubular adenoma with recommended 5 year follow-up.      Past Medical History:   Diagnosis Date    Migraine headache        Past Surgical History:   Procedure Laterality Date    CERVICAL CERCLAGE       SECTION, CLASSIC      COLONOSCOPY N/A 2018    Procedure: COLONOSCOPY;  Surgeon: Viet Telles MD;  Location: [address]);  Service: Endoscopy;  Laterality: N/A;  with CRS    HYSTERECTOMY         Review of patient's allergies indicates:   Allergen Reactions    Rocephin [ceftriaxone] Hives and Itching       Current Outpatient Medications on File Prior to Visit   Medication Sig Dispense Refill    BIOTIN ORAL Take 200 mg by mouth once daily.      metroNIDAZOLE (FLAGYL) 500 MG tablet Take 4 tabs by mouth once      multivitamin (ONE DAILY MULTIVITAMIN) per tablet Take 1 tablet by mouth once daily.      sumatriptan (IMITREX) 25 MG Tab Take 50 mg by mouth every 2 (two) hours " as needed.      [DISCONTINUED] albuterol (PROVENTIL/VENTOLIN HFA) 90 mcg/actuation inhaler Inhale 2 puffs into the lungs every 4 (four) hours as needed for Wheezing or Shortness of Breath. Rescue 1 Inhaler 1    [DISCONTINUED] hydrocortisone (ANUSOL-HC) 25 mg suppository Place 1 suppository (25 mg total) rectally 2 (two) times daily. 24 suppository 0    [DISCONTINUED] meloxicam (MOBIC) 15 MG tablet Take 1 tablet (15 mg total) by mouth once daily. 30 tablet 0    [DISCONTINUED] naproxen (NAPROSYN) 500 MG tablet Take 1 tablet (500 mg total) by mouth 2 (two) times daily with meals. 30 tablet 0    [DISCONTINUED] promethazine-codeine 6.25-10 mg/5 ml (PHENERGAN WITH CODEINE) 6.25-10 mg/5 mL syrup Take 5 mLs by mouth every 4 (four) hours as needed for Cough. 118 mL 0     No current facility-administered medications on file prior to visit.        Family History   Problem Relation Age of Onset    Heart disease Father     Breast cancer Mother     Diabetes Paternal Grandmother     Colon cancer Neg Hx     Ovarian cancer Neg Hx     Esophageal cancer Neg Hx     Stomach cancer Neg Hx        Social History     Tobacco Use    Smoking status: Never Smoker    Smokeless tobacco: Never Used   Substance Use Topics    Alcohol use: No    Drug use: No        Review of Systems:  ROS:  GENERAL: No fever, chills, fatigability or weight loss.  Integument:  No rashes, redness, icterus  CHEST: Denies MACKENZIE, cyanosis, wheezing, cough and sputum production.  CARDIOVASCULAR: Denies chest pain, PND, orthopnea or reduced exercise tolerance.  GI:  Denies abd pain, dysphagia, nausea, vomiting, no hematemesis, no rectal pain  : Denies burning on urination, no hematuria, no bacteriuria  MSK:  No deformities, swelling, joint pain swelling  Neurologic:  No HAs, seizures, weakness, paresthesias, gait problems      OBJECTIVE:     Vital Signs (Most Recent)  /82 (BP Location: Right arm, Patient Position: Sitting, BP Method: Large  "(Automatic))   Pulse 74   Ht 5' 9" (1.753 m)   Wt 135 kg (297 lb 9.9 oz)   LMP 02/14/2014   BMI 43.95 kg/m²     Physical Exam:  General: Black or  female in no distress   Skin/ Sclera an  HEENT: anicteric, normocephalic, extraocular movements intact   Neck trachea midline  Chest symmetric, nl excursions, no retractions  Respiratory: respirations are even and unlabored     Anorectal:   Inspection         ERI:  normal tone, no masses, normal squeeze, normal relaxation with Valsalva  Extremities: Warm dry and intact.  NO CCE  Neuro: alert and oriented x 4.  Moves all extremities.         ASSESSMENT/PLAN:   1. Probable previous thrombosed external hemorrhoid with spontaneous resolution  2. Mild internal hemorrhoids with bleeding      Recommend  - Initiate high fiber diet and fiber supplement with metamucil  - Discontinue magnesium laxative, but use Miralax as needed to reduce straining  - Follow-up office visit in 6 weeks.    Viet Nugent MD  Colon and Rectal Surgery Fellow    I have personally obtained a history and performed a physical exam with and independent to my resident and discussed the findings and plan with the patient.  I agree with the above findings and plan with the following exceptions:  None    H, Viet Trevino MD, FACS, FASCRS  Staff Surgeon  Dept of Colon and Rectal Surgery  [facility] [place], [place]      Anoscopy Procedure Note:   Verbal consent obtained    Indications:  bleeding    Post procedure diagnosis:  Mild internal hemorrhoids    Procedure: anoscopy    Surgeon MOISES    Assistant: Raffi    Specimen None    Findings:  Grade 1-2 internal hemorrhoids    Right posterior hemorrhoid grade 2  Right anterior hemorrhoid grade 1  Left lateral hemorrhoid grade 2    Pt tolerated procedure well.      No complications.          "

## 2020-04-01 ENCOUNTER — TELEPHONE (OUTPATIENT)
Dept: SURGERY | Facility: CLINIC | Age: 45
End: 2020-04-01

## 2020-04-01 NOTE — TELEPHONE ENCOUNTER
LM on pt vm to call CRS to convert ov into a vv on 04/22/2020, since we are not seeing pt's in clinic at this time due to covid19 concerns.

## 2020-04-03 ENCOUNTER — PATIENT MESSAGE (OUTPATIENT)
Dept: SURGERY | Facility: CLINIC | Age: 45
End: 2020-04-03

## 2020-05-15 ENCOUNTER — TELEPHONE (OUTPATIENT)
Dept: SURGERY | Facility: CLINIC | Age: 45
End: 2020-05-15

## 2021-02-22 ENCOUNTER — HOSPITAL ENCOUNTER (EMERGENCY)
Facility: HOSPITAL | Age: 46
Discharge: HOME | End: 2021-02-22
Attending: EMERGENCY MEDICINE
Payer: COMMERCIAL

## 2021-02-22 VITALS
TEMPERATURE: 98 F | BODY MASS INDEX: 42.91 KG/M2 | WEIGHT: 267 LBS | OXYGEN SATURATION: 99 % | SYSTOLIC BLOOD PRESSURE: 133 MMHG | DIASTOLIC BLOOD PRESSURE: 82 MMHG | HEART RATE: 58 BPM | RESPIRATION RATE: 17 BRPM | HEIGHT: 66 IN

## 2021-02-22 DIAGNOSIS — K59.00 CONSTIPATION, UNSPECIFIED CONSTIPATION TYPE: ICD-10-CM

## 2021-02-22 DIAGNOSIS — K64.4 EXTERNAL HEMORRHOID: Primary | ICD-10-CM

## 2021-02-22 DIAGNOSIS — Z98.84 HISTORY OF BARIATRIC SURGERY: ICD-10-CM

## 2021-02-22 LAB
ALBUMIN SERPL BCP-MCNC: 3.9 G/DL (ref 3.5–5.2)
ALP SERPL-CCNC: 96 U/L (ref 55–135)
ALT SERPL W/O P-5'-P-CCNC: 22 U/L (ref 10–44)
ANION GAP SERPL CALC-SCNC: 10 MMOL/L (ref 8–16)
AST SERPL-CCNC: 19 U/L (ref 10–40)
BASOPHILS # BLD AUTO: 0.04 K/UL (ref 0–0.2)
BASOPHILS NFR BLD: 0.4 % (ref 0–1.9)
BILIRUB SERPL-MCNC: 0.6 MG/DL (ref 0.1–1)
BUN SERPL-MCNC: 10 MG/DL (ref 6–20)
CALCIUM SERPL-MCNC: 9.4 MG/DL (ref 8.7–10.5)
CHLORIDE SERPL-SCNC: 106 MMOL/L (ref 95–110)
CO2 SERPL-SCNC: 24 MMOL/L (ref 23–29)
CREAT SERPL-MCNC: 0.8 MG/DL (ref 0.5–1.4)
DIFFERENTIAL METHOD: ABNORMAL
EOSINOPHIL # BLD AUTO: 0.2 K/UL (ref 0–0.5)
EOSINOPHIL NFR BLD: 2.2 % (ref 0–8)
ERYTHROCYTE [DISTWIDTH] IN BLOOD BY AUTOMATED COUNT: 14.3 % (ref 11.5–14.5)
EST. GFR  (AFRICAN AMERICAN): >60 ML/MIN/1.73 M^2
EST. GFR  (NON AFRICAN AMERICAN): >60 ML/MIN/1.73 M^2
GLUCOSE SERPL-MCNC: 88 MG/DL (ref 70–110)
HCT VFR BLD AUTO: 43.3 % (ref 37–48.5)
HGB BLD-MCNC: 13.4 G/DL (ref 12–16)
IMM GRANULOCYTES # BLD AUTO: 0.02 K/UL (ref 0–0.04)
IMM GRANULOCYTES NFR BLD AUTO: 0.2 % (ref 0–0.5)
LYMPHOCYTES # BLD AUTO: 2.2 K/UL (ref 1–4.8)
LYMPHOCYTES NFR BLD: 24.7 % (ref 18–48)
MAGNESIUM SERPL-MCNC: 2 MG/DL (ref 1.6–2.6)
MCH RBC QN AUTO: 26.8 PG (ref 27–31)
MCHC RBC AUTO-ENTMCNC: 30.9 G/DL (ref 32–36)
MCV RBC AUTO: 87 FL (ref 82–98)
MONOCYTES # BLD AUTO: 0.5 K/UL (ref 0.3–1)
MONOCYTES NFR BLD: 6 % (ref 4–15)
NEUTROPHILS # BLD AUTO: 5.9 K/UL (ref 1.8–7.7)
NEUTROPHILS NFR BLD: 66.5 % (ref 38–73)
NRBC BLD-RTO: 0 /100 WBC
PLATELET # BLD AUTO: 198 K/UL (ref 150–350)
PMV BLD AUTO: 11.9 FL (ref 9.2–12.9)
POTASSIUM SERPL-SCNC: 3.7 MMOL/L (ref 3.5–5.1)
PROT SERPL-MCNC: 7.7 G/DL (ref 6–8.4)
RBC # BLD AUTO: 5 M/UL (ref 4–5.4)
SODIUM SERPL-SCNC: 140 MMOL/L (ref 136–145)
WBC # BLD AUTO: 8.93 K/UL (ref 3.9–12.7)

## 2021-02-22 PROCEDURE — 86703 HIV-1/HIV-2 1 RESULT ANTBDY: CPT

## 2021-02-22 PROCEDURE — 99283 EMERGENCY DEPT VISIT LOW MDM: CPT | Mod: 25

## 2021-02-22 PROCEDURE — 86803 HEPATITIS C AB TEST: CPT

## 2021-02-22 PROCEDURE — 80053 COMPREHEN METABOLIC PANEL: CPT

## 2021-02-22 PROCEDURE — 99284 EMERGENCY DEPT VISIT MOD MDM: CPT | Mod: ,,, | Performed by: EMERGENCY MEDICINE

## 2021-02-22 PROCEDURE — 96360 HYDRATION IV INFUSION INIT: CPT

## 2021-02-22 PROCEDURE — 99284 PR EMERGENCY DEPT VISIT,LEVEL IV: ICD-10-PCS | Mod: ,,, | Performed by: EMERGENCY MEDICINE

## 2021-02-22 PROCEDURE — 25000003 PHARM REV CODE 250: Performed by: EMERGENCY MEDICINE

## 2021-02-22 PROCEDURE — 85025 COMPLETE CBC W/AUTO DIFF WBC: CPT

## 2021-02-22 PROCEDURE — 83735 ASSAY OF MAGNESIUM: CPT

## 2021-02-22 RX ORDER — ONDANSETRON 4 MG/1
8 TABLET, ORALLY DISINTEGRATING ORAL 2 TIMES DAILY
COMMUNITY

## 2021-02-22 RX ORDER — BISACODYL 5 MG
5 TABLET, DELAYED RELEASE (ENTERIC COATED) ORAL DAILY PRN
COMMUNITY

## 2021-02-22 RX ORDER — HYOSCYAMINE SULFATE 0.125 MG
125 TABLET ORAL EVERY 4 HOURS PRN
COMMUNITY

## 2021-02-22 RX ORDER — PANTOPRAZOLE SODIUM 40 MG/1
40 TABLET, DELAYED RELEASE ORAL DAILY
COMMUNITY

## 2021-02-22 RX ADMIN — SODIUM CHLORIDE 1000 ML: 0.9 INJECTION, SOLUTION INTRAVENOUS at 05:02

## 2021-02-23 LAB
HCV AB SERPL QL IA: NEGATIVE
HIV 1+2 AB+HIV1 P24 AG SERPL QL IA: NEGATIVE